# Patient Record
Sex: MALE | Race: BLACK OR AFRICAN AMERICAN | NOT HISPANIC OR LATINO
[De-identification: names, ages, dates, MRNs, and addresses within clinical notes are randomized per-mention and may not be internally consistent; named-entity substitution may affect disease eponyms.]

---

## 2018-03-27 PROBLEM — Z00.00 ENCOUNTER FOR PREVENTIVE HEALTH EXAMINATION: Status: ACTIVE | Noted: 2018-03-27

## 2018-04-23 ENCOUNTER — APPOINTMENT (OUTPATIENT)
Dept: OTHER | Facility: CLINIC | Age: 48
End: 2018-04-23
Payer: COMMERCIAL

## 2018-04-23 ENCOUNTER — LABORATORY RESULT (OUTPATIENT)
Age: 48
End: 2018-04-23

## 2018-04-23 VITALS
SYSTOLIC BLOOD PRESSURE: 148 MMHG | HEIGHT: 66 IN | DIASTOLIC BLOOD PRESSURE: 94 MMHG | WEIGHT: 292 LBS | RESPIRATION RATE: 16 BRPM | OXYGEN SATURATION: 100 % | BODY MASS INDEX: 46.93 KG/M2 | HEART RATE: 65 BPM

## 2018-04-23 DIAGNOSIS — Z99.89 OBSTRUCTIVE SLEEP APNEA (ADULT) (PEDIATRIC): ICD-10-CM

## 2018-04-23 DIAGNOSIS — Z04.9 ENCOUNTER FOR EXAMINATION AND OBSERVATION FOR UNSPECIFIED REASON: ICD-10-CM

## 2018-04-23 DIAGNOSIS — Z80.42 FAMILY HISTORY OF MALIGNANT NEOPLASM OF PROSTATE: ICD-10-CM

## 2018-04-23 DIAGNOSIS — G47.33 OBSTRUCTIVE SLEEP APNEA (ADULT) (PEDIATRIC): ICD-10-CM

## 2018-04-23 DIAGNOSIS — F17.290 NICOTINE DEPENDENCE, OTHER TOBACCO PRODUCT, UNCOMPLICATED: ICD-10-CM

## 2018-04-23 PROCEDURE — 96150: CPT

## 2018-04-23 PROCEDURE — 94060 EVALUATION OF WHEEZING: CPT

## 2018-04-23 PROCEDURE — 99386 PREV VISIT NEW AGE 40-64: CPT

## 2018-04-24 LAB
ALBUMIN SERPL ELPH-MCNC: 3.8 G/DL
ALP BLD-CCNC: 99 U/L
ALT SERPL-CCNC: 22 U/L
ANION GAP SERPL CALC-SCNC: 13 MMOL/L
APPEARANCE: CLEAR
AST SERPL-CCNC: 17 U/L
BASOPHILS # BLD AUTO: 0.03 K/UL
BASOPHILS NFR BLD AUTO: 0.3 %
BILIRUB SERPL-MCNC: 0.4 MG/DL
BILIRUBIN URINE: NEGATIVE
BLOOD URINE: NEGATIVE
BUN SERPL-MCNC: 10 MG/DL
CALCIUM SERPL-MCNC: 9.2 MG/DL
CHLORIDE SERPL-SCNC: 103 MMOL/L
CHOLEST SERPL-MCNC: 150 MG/DL
CHOLEST/HDLC SERPL: 5.2 RATIO
CO2 SERPL-SCNC: 29 MMOL/L
COLOR: YELLOW
CREAT SERPL-MCNC: 0.95 MG/DL
EOSINOPHIL # BLD AUTO: 0.08 K/UL
EOSINOPHIL NFR BLD AUTO: 0.9 %
GLUCOSE QUALITATIVE U: NEGATIVE MG/DL
GLUCOSE SERPL-MCNC: 133 MG/DL
HCT VFR BLD CALC: 40 %
HDLC SERPL-MCNC: 29 MG/DL
HGB BLD-MCNC: 12.1 G/DL
IMM GRANULOCYTES NFR BLD AUTO: 0.3 %
KETONES URINE: NEGATIVE
LDLC SERPL CALC-MCNC: 101 MG/DL
LEUKOCYTE ESTERASE URINE: ABNORMAL
LYMPHOCYTES # BLD AUTO: 3.11 K/UL
LYMPHOCYTES NFR BLD AUTO: 33.5 %
MAN DIFF?: NORMAL
MCHC RBC-ENTMCNC: 24 PG
MCHC RBC-ENTMCNC: 30.3 GM/DL
MCV RBC AUTO: 79.4 FL
MONOCYTES # BLD AUTO: 0.41 K/UL
MONOCYTES NFR BLD AUTO: 4.4 %
NEUTROPHILS # BLD AUTO: 5.62 K/UL
NEUTROPHILS NFR BLD AUTO: 60.6 %
NITRITE URINE: NEGATIVE
PH URINE: 5.5
PLATELET # BLD AUTO: 379 K/UL
POTASSIUM SERPL-SCNC: 4.9 MMOL/L
PROT SERPL-MCNC: 7.7 G/DL
PROTEIN URINE: NEGATIVE MG/DL
RBC # BLD: 5.04 M/UL
RBC # FLD: 16.2 %
SODIUM SERPL-SCNC: 145 MMOL/L
SPECIFIC GRAVITY URINE: 1.02
TRIGL SERPL-MCNC: 99 MG/DL
UROBILINOGEN URINE: 1 MG/DL
WBC # FLD AUTO: 9.28 K/UL

## 2018-05-07 ENCOUNTER — TRANSCRIPTION ENCOUNTER (OUTPATIENT)
Age: 48
End: 2018-05-07

## 2018-05-23 ENCOUNTER — TRANSCRIPTION ENCOUNTER (OUTPATIENT)
Age: 48
End: 2018-05-23

## 2018-06-11 ENCOUNTER — APPOINTMENT (OUTPATIENT)
Dept: PSYCHIATRY | Facility: CLINIC | Age: 48
End: 2018-06-11

## 2019-03-25 ENCOUNTER — APPOINTMENT (OUTPATIENT)
Dept: OTHER | Facility: CLINIC | Age: 49
End: 2019-03-25

## 2022-06-23 ENCOUNTER — APPOINTMENT (OUTPATIENT)
Dept: OTHER | Facility: CLINIC | Age: 52
End: 2022-06-23

## 2022-11-08 ENCOUNTER — FORM ENCOUNTER (OUTPATIENT)
Age: 52
End: 2022-11-08

## 2023-03-09 ENCOUNTER — TRANSCRIPTION ENCOUNTER (OUTPATIENT)
Age: 53
End: 2023-03-09

## 2024-11-07 ENCOUNTER — APPOINTMENT (OUTPATIENT)
Dept: OTHER | Facility: CLINIC | Age: 54
End: 2024-11-07

## 2024-12-03 ENCOUNTER — APPOINTMENT (OUTPATIENT)
Dept: OTHER | Facility: CLINIC | Age: 54
End: 2024-12-03

## 2024-12-03 DIAGNOSIS — Z04.9 ENCOUNTER FOR EXAMINATION AND OBSERVATION FOR UNSPECIFIED REASON: ICD-10-CM

## 2025-02-16 ENCOUNTER — INPATIENT (INPATIENT)
Facility: HOSPITAL | Age: 55
LOS: 2 days | Discharge: ANOTHER HOSPITAL | End: 2025-02-19
Attending: HOSPITALIST | Admitting: INTERNAL MEDICINE
Payer: OTHER GOVERNMENT

## 2025-02-16 VITALS
TEMPERATURE: 103 F | WEIGHT: 296.08 LBS | DIASTOLIC BLOOD PRESSURE: 95 MMHG | SYSTOLIC BLOOD PRESSURE: 132 MMHG | OXYGEN SATURATION: 96 % | RESPIRATION RATE: 18 BRPM | HEART RATE: 124 BPM | HEIGHT: 66 IN

## 2025-02-16 LAB
ALBUMIN SERPL ELPH-MCNC: 2.6 G/DL — LOW (ref 3.3–5)
ALP SERPL-CCNC: 71 U/L — SIGNIFICANT CHANGE UP (ref 40–120)
ALT FLD-CCNC: 55 U/L — SIGNIFICANT CHANGE UP (ref 12–78)
ANION GAP SERPL CALC-SCNC: 12 MMOL/L — SIGNIFICANT CHANGE UP (ref 5–17)
APPEARANCE UR: ABNORMAL
APTT BLD: 26.2 SEC — SIGNIFICANT CHANGE UP (ref 24.5–35.6)
AST SERPL-CCNC: 21 U/L — SIGNIFICANT CHANGE UP (ref 15–37)
BASOPHILS # BLD AUTO: 0 K/UL — SIGNIFICANT CHANGE UP (ref 0–0.2)
BASOPHILS NFR BLD AUTO: 0 % — SIGNIFICANT CHANGE UP (ref 0–2)
BILIRUB SERPL-MCNC: 0.4 MG/DL — SIGNIFICANT CHANGE UP (ref 0.2–1.2)
BILIRUB UR-MCNC: NEGATIVE — SIGNIFICANT CHANGE UP
BUN SERPL-MCNC: 38 MG/DL — HIGH (ref 7–23)
CALCIUM SERPL-MCNC: 9.5 MG/DL — SIGNIFICANT CHANGE UP (ref 8.5–10.1)
CHLORIDE SERPL-SCNC: 99 MMOL/L — SIGNIFICANT CHANGE UP (ref 96–108)
CO2 SERPL-SCNC: 25 MMOL/L — SIGNIFICANT CHANGE UP (ref 22–31)
COLOR SPEC: ABNORMAL
CREAT SERPL-MCNC: 2.2 MG/DL — HIGH (ref 0.5–1.3)
DIFF PNL FLD: ABNORMAL
EGFR: 35 ML/MIN/1.73M2 — LOW
EOSINOPHIL # BLD AUTO: 0 K/UL — SIGNIFICANT CHANGE UP (ref 0–0.5)
EOSINOPHIL NFR BLD AUTO: 0 % — SIGNIFICANT CHANGE UP (ref 0–6)
FLUAV AG NPH QL: SIGNIFICANT CHANGE UP
FLUBV AG NPH QL: SIGNIFICANT CHANGE UP
GLUCOSE SERPL-MCNC: 282 MG/DL — HIGH (ref 70–99)
GLUCOSE UR QL: >=1000 MG/DL
HCT VFR BLD CALC: 45.5 % — SIGNIFICANT CHANGE UP (ref 39–50)
HGB BLD-MCNC: 14.5 G/DL — SIGNIFICANT CHANGE UP (ref 13–17)
INR BLD: 1.43 RATIO — HIGH (ref 0.85–1.16)
KETONES UR-MCNC: NEGATIVE MG/DL — SIGNIFICANT CHANGE UP
LACTATE SERPL-SCNC: 3.9 MMOL/L — HIGH (ref 0.7–2)
LACTATE SERPL-SCNC: 5.8 MMOL/L — CRITICAL HIGH (ref 0.7–2)
LEUKOCYTE ESTERASE UR-ACNC: ABNORMAL
LYMPHOCYTES # BLD AUTO: 0.85 K/UL — LOW (ref 1–3.3)
LYMPHOCYTES # BLD AUTO: 5 % — LOW (ref 13–44)
MCHC RBC-ENTMCNC: 28.1 PG — SIGNIFICANT CHANGE UP (ref 27–34)
MCHC RBC-ENTMCNC: 31.9 G/DL — LOW (ref 32–36)
MCV RBC AUTO: 88.2 FL — SIGNIFICANT CHANGE UP (ref 80–100)
MONOCYTES # BLD AUTO: 0.68 K/UL — SIGNIFICANT CHANGE UP (ref 0–0.9)
MONOCYTES NFR BLD AUTO: 4 % — SIGNIFICANT CHANGE UP (ref 2–14)
NEUTROPHILS # BLD AUTO: 14.22 K/UL — HIGH (ref 1.8–7.4)
NEUTROPHILS NFR BLD AUTO: 75 % — SIGNIFICANT CHANGE UP (ref 43–77)
NITRITE UR-MCNC: NEGATIVE — SIGNIFICANT CHANGE UP
NRBC BLD AUTO-RTO: SIGNIFICANT CHANGE UP /100 WBCS (ref 0–0)
PH UR: 7.5 — SIGNIFICANT CHANGE UP (ref 5–8)
PLATELET # BLD AUTO: 251 K/UL — SIGNIFICANT CHANGE UP (ref 150–400)
POTASSIUM SERPL-MCNC: 3.7 MMOL/L — SIGNIFICANT CHANGE UP (ref 3.5–5.3)
POTASSIUM SERPL-SCNC: 3.7 MMOL/L — SIGNIFICANT CHANGE UP (ref 3.5–5.3)
PROT SERPL-MCNC: 6.8 GM/DL — SIGNIFICANT CHANGE UP (ref 6–8.3)
PROT UR-MCNC: >=1000 MG/DL
PROTHROM AB SERPL-ACNC: 16.1 SEC — HIGH (ref 9.9–13.4)
RBC # BLD: 5.16 M/UL — SIGNIFICANT CHANGE UP (ref 4.2–5.8)
RBC # FLD: 16.6 % — HIGH (ref 10.3–14.5)
RSV RNA NPH QL NAA+NON-PROBE: SIGNIFICANT CHANGE UP
SARS-COV-2 RNA SPEC QL NAA+PROBE: SIGNIFICANT CHANGE UP
SODIUM SERPL-SCNC: 136 MMOL/L — SIGNIFICANT CHANGE UP (ref 135–145)
SP GR SPEC: 1.02 — SIGNIFICANT CHANGE UP (ref 1–1.03)
TROPONIN I, HIGH SENSITIVITY RESULT: 9.5 NG/L — SIGNIFICANT CHANGE UP
UROBILINOGEN FLD QL: 1 MG/DL — SIGNIFICANT CHANGE UP (ref 0.2–1)
WBC # BLD: 16.93 K/UL — HIGH (ref 3.8–10.5)
WBC # FLD AUTO: 16.93 K/UL — HIGH (ref 3.8–10.5)

## 2025-02-16 PROCEDURE — 93010 ELECTROCARDIOGRAM REPORT: CPT

## 2025-02-16 PROCEDURE — 99291 CRITICAL CARE FIRST HOUR: CPT

## 2025-02-16 PROCEDURE — 74177 CT ABD & PELVIS W/CONTRAST: CPT | Mod: 26

## 2025-02-16 PROCEDURE — 71045 X-RAY EXAM CHEST 1 VIEW: CPT | Mod: 26

## 2025-02-16 RX ORDER — PIPERACILLIN-TAZO-DEXTROSE,ISO 3.375G/5
3.38 IV SOLUTION, PIGGYBACK PREMIX FROZEN(ML) INTRAVENOUS EVERY 8 HOURS
Refills: 0 | Status: DISCONTINUED | OUTPATIENT
Start: 2025-02-17 | End: 2025-02-18

## 2025-02-16 RX ORDER — VANCOMYCIN HCL IN 5 % DEXTROSE 1.5G/250ML
1000 PLASTIC BAG, INJECTION (ML) INTRAVENOUS ONCE
Refills: 0 | Status: COMPLETED | OUTPATIENT
Start: 2025-02-16 | End: 2025-02-16

## 2025-02-16 RX ORDER — CEFTRIAXONE 500 MG/1
1000 INJECTION, POWDER, FOR SOLUTION INTRAMUSCULAR; INTRAVENOUS ONCE
Refills: 0 | Status: COMPLETED | OUTPATIENT
Start: 2025-02-16 | End: 2025-02-16

## 2025-02-16 RX ORDER — PIPERACILLIN-TAZO-DEXTROSE,ISO 3.375G/5
3.38 IV SOLUTION, PIGGYBACK PREMIX FROZEN(ML) INTRAVENOUS ONCE
Refills: 0 | Status: COMPLETED | OUTPATIENT
Start: 2025-02-16 | End: 2025-02-16

## 2025-02-16 RX ORDER — ACETAMINOPHEN 500 MG/5ML
650 LIQUID (ML) ORAL ONCE
Refills: 0 | Status: COMPLETED | OUTPATIENT
Start: 2025-02-16 | End: 2025-02-16

## 2025-02-16 RX ADMIN — CEFTRIAXONE 100 MILLIGRAM(S): 500 INJECTION, POWDER, FOR SOLUTION INTRAMUSCULAR; INTRAVENOUS at 19:38

## 2025-02-16 RX ADMIN — Medication 650 MILLIGRAM(S): at 18:17

## 2025-02-16 RX ADMIN — Medication 200 GRAM(S): at 23:14

## 2025-02-16 RX ADMIN — Medication 2000 MILLILITER(S): at 18:12

## 2025-02-16 NOTE — ED ADULT TRIAGE NOTE - CHIEF COMPLAINT QUOTE
[facility] generalized weakness, increased sleeping, and tachycardia since this AM. Tracey in place with hematuria, pt states he is on/off with UTI. Pt AOx4, PMH HTN, DM, Hypothyroidism, afib

## 2025-02-16 NOTE — ED PROVIDER NOTE - CLINICAL SUMMARY MEDICAL DECISION MAKING FREE TEXT BOX
Patient is a 54-year-old male presenting to the emergency department today for urinary symptoms.  CBC shows leukocytosis.  No acute anemia.  No thrombocytopenia.  CMP shows no significant electrolyte abnormalities.  Creatinine noted to be 2.20.  No prior creatinine noted while in our emergency department.  No evidence of elevated LFTs.  Urinalysis shows evidence of urinary tract infection.  He was covered with Rocephin.  No COVID, flu, or RSV.  Patient was ordered Rocephin.  Ordered 2000 mL of normal saline.    Noted to have hematuria.  Tracey catheter will be exchanged.    CT of the abdomen pelvis was ordered, however has not resulted at this time.    Is a CT of the abdomen pelvis is pending, the patient be signed out to the oncoming physician.  Please see the oncoming physician's addendum's, notes, and progress notes for any change in this patient's management or care.

## 2025-02-16 NOTE — CONSULT NOTE ADULT - ASSESSMENT
54M with transverse myelitis presenting in urosepsis  hematuria in palmer, 2/2 infection, palmer in place for  neurogenic bladder  Imaging shows grade 1 emphysematous pyelonephritis, perinephric streaking extending in retroperitoneum  no ureter dilitation  no obstucting stone  no old records available in [facility] for comparison  Recommend to continue IV abx, infdection likely MDR, f/u culture  IVF resuscitation  ICU consult  likely rescan tomorrow for progression of hydro  discussed with Dr Reeves

## 2025-02-16 NOTE — ED PROVIDER NOTE - PHYSICAL EXAMINATION
GENERAL: The patient appears well and is in no apparent distress.    EYES: Pupils equal and reactive.  Extraocular eye movements are intact.    ENT: Head is atraumatic.  Posterior oropharynx is unremarkable.      RESPIRATORY: Lungs are clear to auscultation bilaterally.  The patient has no significant wheezing, rhonchi, or rales.    CARDIOVASCULAR: Tachycardic rate    ABDOMEN: Abdomen is soft, nondistended, and non-peritoneal.  The patient has no focal areas of tenderness.  Patient is chronic indwelling Tracey catheter    SKIN: Skin is intact without evidence of significant lacerations or sores.    MUSCULOSKELETAL: Patient has good range of motion of all extremities.  The patient has good distal cap refill.  The patient has palpable distal pulses.  No obvious edema is noted.    NEUROLOGIC: Cranial nerves II through XII are grossly within normal limits.  Sensory and motor examination is unremarkable.    PSYCHIATRIC: Patient is awake, alert, and oriented ×4.

## 2025-02-16 NOTE — ED PROVIDER NOTE - OBJECTIVE STATEMENT
This patient is a 54-year-old male presenting to the emergency department today for urinary symptoms as well as fever.  He has a past medical history of transverse myelitis.  Near complete paralysis from the waist down.  Chronic indwelling Tracey catheter.  States that for roughly the last 1 week has been noticing blood in the Tracey catheter.  He does reside at a nursing facility.  States that he was placed on antibiotics for possible urinary tract infection last week.  Today he was noticed to have weakness and increased lethargy.  He has no chest pain.  No headaches, lightheadedness, or dizziness.  No shortness of breath.  No abdominal pain.  No other complaints at this time.

## 2025-02-16 NOTE — PROVIDER CONTACT NOTE (EICU) - BACKGROUND
54 year old male with Transverse myelitis, Bed bound, chronic palmer with obesity with TESSIE who p/w hematuria   In ED found to have ESEQUIEL and emphysematous nephritis

## 2025-02-16 NOTE — H&P ADULT - NSICDXPASTMEDICALHX_GEN_ALL_CORE_FT
PAST MEDICAL HISTORY:  Anemia of chronic disease     Chronic indwelling Tracey catheter     HTN (hypertension)     Morbid obesity     Transverse myelitis

## 2025-02-16 NOTE — ED PROVIDER NOTE - NS ED ROS FT
CONSTITUTIONAL: Positive for weakness and fevers.  No weight loss.  No chills.    HEENT:    Eyes: No visual loss, blurred vision, double vision or yellow sclerae.  Ears, Nose, Throat: No hearing loss, sneezing, congestion, runny nose or sore throat.    CARDIOVASCULAR: No chest pain, chest pressure or chest discomfort. No palpitations or edema.    RESPIRATORY: No shortness of breath, cough or sputum.    GASTROINTESTINAL: No anorexia, nausea, vomiting or diarrhea. No abdominal pain or blood.    SKIN: No rash or itching.    GENITOURINARY: Patient denies any changes to bowel or bladder function.  Positive for hematuria    NEUROLOGICAL: No headache, dizziness, syncope, paralysis, ataxia, numbness or tingling in the extremities. No change in bowel or bladder control.    MUSCULOSKELETAL: No muscle, back pain, joint pain or stiffness.

## 2025-02-16 NOTE — ED ADULT NURSE NOTE - ED STAT RN HANDOFF DETAILS
report given to Rashmi SALMERON. pt with levo infusing via 20G L AC access and fluids/vanco infusing via 22G R AC access. pt A&OX3.

## 2025-02-16 NOTE — H&P ADULT - HISTORY OF PRESENT ILLNESS
53 yo m pmhx morbid obesity, transverse myelitis, paralyzed from waist down, chronic palmer, TESSIE on nocturnal CPAP, anemia, HTN, HLD biba from facility with weakness/lethargy and hematuria.  Per patient, his palmer was replaced on 2/10, since has had some hematuria and was placed on antibiotics for concern UTI.  Today patient lethargic, weak prompting presentation.  In ED patient febrile to 102.9F, tachycardic to the 120s, normotensive, labs significant for wbc ~16, BUN/Cr 38/2.2, lactate 5.8, +UA, ct abd/pelvis suggestive of emphysematous cystitis and pyelitis.  ICU consult for hypotension.  Initial -140/90s now 90s/60s. Admit to ICU.

## 2025-02-16 NOTE — ED PROVIDER NOTE - NS ED MD DISPO ADMITTING SERVICE
Nausea / Vomiting    Nausea is the feeling that you have to vomit. As nausea gets worse, it can lead to vomiting. Vomiting puts you at an increased risk for dehydration. Older adults and people with other diseases or a weak immune system are at higher risk for dehydration. Drink clear fluids in small but frequent amounts as tolerated. Eat bland, easy-to-digest foods in small amounts as tolerated.    SEEK IMMEDIATE MEDICAL CARE IF YOU HAVE ANY OF THE FOLLOWING SYMPTOMS: fever, inability to keep sufficient fluids down, black or bloody vomitus, black or bloody stools, lightheadedness/dizziness, chest pain, severe headache, rash, shortness of breath, cold or clammy skin, confusion, pain with urination, or any signs of dehydration.
ICU

## 2025-02-16 NOTE — H&P ADULT - NSHPPHYSICALEXAM_GEN_ALL_CORE
GENERAL: morbidly obese, pleasant male, lying in bed, nad  HEENT: nc/at CPAP in place  CV: rrr  RESP: diminished but grossly cta b/l   ABD: soft, nontender, nondistended, +bs  : palmer in place, penis wnl, urine draining with hematuria/yellow/tan creamy output  MSK: unable to fully assess 2/2 obesity   EXT: as below  SKIN: warm, non pitting edema  NEURO: alert, oriented and interactive

## 2025-02-16 NOTE — CONSULT NOTE ADULT - SUBJECTIVE AND OBJECTIVE BOX
Chief Complaint:  Patient is a 54y old  Male who presents with a chief complaint of hematuria    HPI: 54M with h/o transverse myelitis, chronic palmer catheter, sent from facility for lethargy today, hematuria for last week, has been recieving abx (unknown type) at facility for tx of UTI.  Febrile, tachycardic here at triage.  Gross pink hematuria in palmer    Physical Exam:    Vital Signs:  Vital Signs Last 24 Hrs  T(C): 37.3 (16 Feb 2025 21:06), Max: 39.4 (16 Feb 2025 17:14)  T(F): 99.2 (16 Feb 2025 21:06), Max: 102.9 (16 Feb 2025 17:14)  HR: 101 (16 Feb 2025 21:06) (101 - 124)  BP: 94/67 (16 Feb 2025 21:06) (94/67 - 132/95)  BP(mean): --  RR: 19 (16 Feb 2025 21:06) (18 - 19)  SpO2: 94% (16 Feb 2025 21:06) (94% - 96%)    Parameters below as of 16 Feb 2025 21:06  Patient On (Oxygen Delivery Method): room air      Daily Height in cm: 167.64 (16 Feb 2025 17:14)    Daily     Constitutional: NAD  Respiratory: normal work of breathing  : palmer with pink urine output       Imaging:    < from: CT Abdomen and Pelvis w/ IV Cont (02.16.25 @ 20:08) >    *** ADDENDUM # 1 ***    Addendum to the body of the report entitled KIDNEYS:    No right-sided renal calculi are seen. In the lower pole of the left   kidney there are round hyperdense foci measuring up to 6 mm that may   represent intrarenal stones. No hydronephrosis of the left kidney is seen.    Of note, the patient has a Palmer catheter with trace amount of air in the   urinary bladder and therefore the differential for the air in the renal   collecting systems could be iatrogenic, however, given the presence of   debris in the collecting system, emphysematous pyelitis should be   excluded.    --- End of Report ---    *** END OF ADDENDUM # 1 ***      PROCEDURE DATE:  02/16/2025          INTERPRETATION:  CLINICAL INFORMATION: Fever and urinary symptoms.    COMPARISON: None.    CONTRAST/COMPLICATIONS:  IV Contrast: Omnipaque 350  90 cc administered   10 cc discarded  Oral Contrast: NONE  .    PROCEDURE:  CT of the Abdomen and Pelvis was performed.  Sagittal and coronal reformats were performed.    FINDINGS:  LOWER CHEST: Within normal limits.    LIVER: Within normal limits.  BILE DUCTS: Normal caliber.  GALLBLADDER: Cholecystectomy.  SPLEEN: Within normal limits.  PANCREAS: Within normal limits.  ADRENALS: Within normal limits.  KIDNEYS/URETERS: Mild right hydronephrosis with distention of the   extrarenal pelvis with gas and debris. Mild right perinephric stranding   extending along the posterior pararenal space into the pelvis.   Thick-walled left extrarenal pelvis contains gas. No left-sided   hydronephrosis. Symmetric bilateral renal enhancement. Left upper pole   cyst.    BLADDER: Decompressed by Palmer catheter.  REPRODUCTIVE ORGANS: Prostate within normal limits.    BOWEL: No bowel obstruction. Appendix is normal.  PERITONEUM/RETROPERITONEUM: Within normal limits.  VESSELS: Within normal limits.  LYMPH NODES: No lymphadenopathy.  ABDOMINAL WALL: Moderate fat-containing umbilical hernia.  BONES: Moderate superior endplate compression deformity at L2, likely   chronic. Degenerative changes of the spine.    IMPRESSION:  Gas and debris within the renal collecting systems along with thickening   of the left renal pelvis. 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 Please correlate with urinalysis.    Mild hydronephrosis of the right kidney for which urology consult is   advised.        --- End of Report ---    ***Please see the addendum at the top of this report. It may contain   additional important information or changes.****        KRISTA FORBES MD  This document has been electronically signed. Feb 16 2025  9:48PM  1st Addendum: KRISTA FORBES MD  The first addendum was electronically signed on: Feb 16 2025 10:37PM.    < end of copied text >

## 2025-02-16 NOTE — H&P ADULT - ASSESSMENT
55 yo m pmhx morbid obesity, transverse myelitis, paralyzed from waist down, chronic palmer, TESSIE on nocturnal CPAP, anemia, HTN, HLD admitted with     1. Uroseptic shock   2. ESEQUIEL    NEURO: No active issues  CV: Severe sepsis bordering septic shock, levophed for map >65, additional IVF as tolerated; trend lactate  RESP: CPAP qhs   RENAL: avoid nephrotoxic meds, renally dose meds, trend urine output, bun/cr and electrolytes. repalce lytes as needed.    GI: npo except water/meds  ENDO: poct q6hr   ID: Zosyn/vancomycin for coverage. ux pending; repeat ct abd/pelvis tomorrow to monitor progression per urology   HEME:  hold vte ppx in setting of hematuria   DISPO: Full code.      case discussed with eicu attending Dr. Ansari.     Critical Care time: 60 mins assessing presenting problems of acute illness that poses high probability of life threatening deterioration or end organ damage/dysfunction.  Medical decision making including Initiating plan of care, reviewing data, reviewing radiology, discussing with multidisciplinary team, non inclusive of procedures, discussing goals of care with patient/family    DATE OF DOCUMENTATION EQUIVALENT TO DATE OF SERVICES RENDERED

## 2025-02-16 NOTE — ED ADULT NURSE NOTE - OBJECTIVE STATEMENT
patient [facility]Ox[facility] generalized weakness. Patient states he has had increased lethargy, fever, and tachycardia since this AM. Tracey in place with hematuria, pt states he is on/off with UTI. Patient denies any chest pain, sob, rubi.

## 2025-02-16 NOTE — ED ADULT NURSE NOTE - NSFALLHARMRISKINTERV_ED_ALL_ED

## 2025-02-17 LAB
ALBUMIN SERPL ELPH-MCNC: 2.2 G/DL — LOW (ref 3.3–5)
ALP SERPL-CCNC: 78 U/L — SIGNIFICANT CHANGE UP (ref 40–120)
ALT FLD-CCNC: 44 U/L — SIGNIFICANT CHANGE UP (ref 12–78)
ANION GAP SERPL CALC-SCNC: 11 MMOL/L — SIGNIFICANT CHANGE UP (ref 5–17)
AST SERPL-CCNC: 12 U/L — LOW (ref 15–37)
BASOPHILS # BLD AUTO: 0.01 K/UL — SIGNIFICANT CHANGE UP (ref 0–0.2)
BASOPHILS NFR BLD AUTO: 0 % — SIGNIFICANT CHANGE UP (ref 0–2)
BILIRUB SERPL-MCNC: 0.7 MG/DL — SIGNIFICANT CHANGE UP (ref 0.2–1.2)
BLD GP AB SCN SERPL QL: SIGNIFICANT CHANGE UP
BUN SERPL-MCNC: 39 MG/DL — HIGH (ref 7–23)
CALCIUM SERPL-MCNC: 8.9 MG/DL — SIGNIFICANT CHANGE UP (ref 8.5–10.1)
CHLORIDE SERPL-SCNC: 103 MMOL/L — SIGNIFICANT CHANGE UP (ref 96–108)
CO2 SERPL-SCNC: 24 MMOL/L — SIGNIFICANT CHANGE UP (ref 22–31)
CREAT SERPL-MCNC: 2.05 MG/DL — HIGH (ref 0.5–1.3)
EGFR: 38 ML/MIN/1.73M2 — LOW
EOSINOPHIL # BLD AUTO: 0.02 K/UL — SIGNIFICANT CHANGE UP (ref 0–0.5)
EOSINOPHIL NFR BLD AUTO: 0.1 % — SIGNIFICANT CHANGE UP (ref 0–6)
GLUCOSE BLDC GLUCOMTR-MCNC: 162 MG/DL — HIGH (ref 70–99)
GLUCOSE BLDC GLUCOMTR-MCNC: 185 MG/DL — HIGH (ref 70–99)
GLUCOSE BLDC GLUCOMTR-MCNC: 267 MG/DL — HIGH (ref 70–99)
GLUCOSE BLDC GLUCOMTR-MCNC: 302 MG/DL — HIGH (ref 70–99)
GLUCOSE BLDC GLUCOMTR-MCNC: 340 MG/DL — HIGH (ref 70–99)
GLUCOSE SERPL-MCNC: 156 MG/DL — HIGH (ref 70–99)
GRAM STN FLD: ABNORMAL
GRAM STN FLD: ABNORMAL
HCT VFR BLD CALC: 41.4 % — SIGNIFICANT CHANGE UP (ref 39–50)
HGB BLD-MCNC: 12.8 G/DL — LOW (ref 13–17)
IMM GRANULOCYTES NFR BLD AUTO: 6.8 % — HIGH (ref 0–0.9)
LACTATE SERPL-SCNC: 2.6 MMOL/L — HIGH (ref 0.7–2)
LYMPHOCYTES # BLD AUTO: 2.24 K/UL — SIGNIFICANT CHANGE UP (ref 1–3.3)
LYMPHOCYTES # BLD AUTO: 6.7 % — LOW (ref 13–44)
MAGNESIUM SERPL-MCNC: 1.8 MG/DL — SIGNIFICANT CHANGE UP (ref 1.6–2.6)
MCHC RBC-ENTMCNC: 27.9 PG — SIGNIFICANT CHANGE UP (ref 27–34)
MCHC RBC-ENTMCNC: 30.9 G/DL — LOW (ref 32–36)
MCV RBC AUTO: 90.2 FL — SIGNIFICANT CHANGE UP (ref 80–100)
METHOD TYPE: SIGNIFICANT CHANGE UP
MONOCYTES # BLD AUTO: 1.13 K/UL — HIGH (ref 0–0.9)
MONOCYTES NFR BLD AUTO: 3.4 % — SIGNIFICANT CHANGE UP (ref 2–14)
MRSA PCR RESULT.: SIGNIFICANT CHANGE UP
NEUTROPHILS # BLD AUTO: 27.74 K/UL — HIGH (ref 1.8–7.4)
NEUTROPHILS NFR BLD AUTO: 83 % — HIGH (ref 43–77)
NRBC BLD AUTO-RTO: 0 /100 WBCS — SIGNIFICANT CHANGE UP (ref 0–0)
PHOSPHATE SERPL-MCNC: 5.4 MG/DL — HIGH (ref 2.5–4.5)
PLATELET # BLD AUTO: 194 K/UL — SIGNIFICANT CHANGE UP (ref 150–400)
POTASSIUM SERPL-MCNC: 4.4 MMOL/L — SIGNIFICANT CHANGE UP (ref 3.5–5.3)
POTASSIUM SERPL-SCNC: 4.4 MMOL/L — SIGNIFICANT CHANGE UP (ref 3.5–5.3)
PROT SERPL-MCNC: 6.2 GM/DL — SIGNIFICANT CHANGE UP (ref 6–8.3)
PROTEUS SP DNA BLD POS QL NAA+NON-PROBE: SIGNIFICANT CHANGE UP
RBC # BLD: 4.59 M/UL — SIGNIFICANT CHANGE UP (ref 4.2–5.8)
RBC # FLD: 16.4 % — HIGH (ref 10.3–14.5)
S AUREUS DNA NOSE QL NAA+PROBE: SIGNIFICANT CHANGE UP
SODIUM SERPL-SCNC: 138 MMOL/L — SIGNIFICANT CHANGE UP (ref 135–145)
SPECIMEN SOURCE: SIGNIFICANT CHANGE UP
SPECIMEN SOURCE: SIGNIFICANT CHANGE UP
WBC # BLD: 33.41 K/UL — HIGH (ref 3.8–10.5)
WBC # FLD AUTO: 33.41 K/UL — HIGH (ref 3.8–10.5)

## 2025-02-17 PROCEDURE — 99291 CRITICAL CARE FIRST HOUR: CPT

## 2025-02-17 RX ORDER — INSULIN GLARGINE-YFGN 100 [IU]/ML
10 INJECTION, SOLUTION SUBCUTANEOUS EVERY MORNING
Refills: 0 | Status: DISCONTINUED | OUTPATIENT
Start: 2025-02-17 | End: 2025-02-17

## 2025-02-17 RX ORDER — FERROUS SULFATE 137(45) MG
325 TABLET, EXTENDED RELEASE ORAL DAILY
Refills: 0 | Status: DISCONTINUED | OUTPATIENT
Start: 2025-02-17 | End: 2025-02-19

## 2025-02-17 RX ORDER — ROSUVASTATIN CALCIUM 20 MG/1
1 TABLET, FILM COATED ORAL
Refills: 0 | DISCHARGE

## 2025-02-17 RX ORDER — TIZANIDINE 4 MG/1
2 TABLET ORAL EVERY 8 HOURS
Refills: 0 | Status: DISCONTINUED | OUTPATIENT
Start: 2025-02-17 | End: 2025-02-19

## 2025-02-17 RX ORDER — NICOTINE POLACRILEX 4 MG/1
1 GUM, CHEWING ORAL
Refills: 0 | DISCHARGE

## 2025-02-17 RX ORDER — SULFAMETHOXAZOLE/TRIMETHOPRIM 800-160 MG
1 TABLET ORAL
Refills: 0 | Status: DISCONTINUED | OUTPATIENT
Start: 2025-02-19 | End: 2025-02-19

## 2025-02-17 RX ORDER — FERROUS SULFATE 137(45) MG
325 TABLET, EXTENDED RELEASE ORAL
Refills: 0 | DISCHARGE

## 2025-02-17 RX ORDER — LEVOTHYROXINE SODIUM 300 MCG
1 TABLET ORAL
Refills: 0 | DISCHARGE

## 2025-02-17 RX ORDER — INSULIN LISPRO 100 U/ML
INJECTION, SOLUTION INTRAVENOUS; SUBCUTANEOUS EVERY 6 HOURS
Refills: 0 | Status: DISCONTINUED | OUTPATIENT
Start: 2025-02-17 | End: 2025-02-17

## 2025-02-17 RX ORDER — LEVOTHYROXINE SODIUM 300 MCG
50 TABLET ORAL DAILY
Refills: 0 | Status: DISCONTINUED | OUTPATIENT
Start: 2025-02-17 | End: 2025-02-19

## 2025-02-17 RX ORDER — HEPARIN SODIUM 1000 [USP'U]/ML
7500 INJECTION INTRAVENOUS; SUBCUTANEOUS EVERY 8 HOURS
Refills: 0 | Status: DISCONTINUED | OUTPATIENT
Start: 2025-02-17 | End: 2025-02-19

## 2025-02-17 RX ORDER — HYDROCORTISONE 20 MG
50 TABLET ORAL EVERY 6 HOURS
Refills: 0 | Status: DISCONTINUED | OUTPATIENT
Start: 2025-02-17 | End: 2025-02-18

## 2025-02-17 RX ORDER — INSULIN LISPRO 100 U/ML
INJECTION, SOLUTION INTRAVENOUS; SUBCUTANEOUS
Refills: 0 | Status: DISCONTINUED | OUTPATIENT
Start: 2025-02-17 | End: 2025-02-19

## 2025-02-17 RX ORDER — METFORMIN HYDROCHLORIDE 850 MG/1
1 TABLET ORAL
Refills: 0 | DISCHARGE

## 2025-02-17 RX ORDER — TAMSULOSIN HYDROCHLORIDE 0.4 MG/1
1 CAPSULE ORAL
Refills: 0 | DISCHARGE

## 2025-02-17 RX ORDER — MAGNESIUM SULFATE 500 MG/ML
1 SYRINGE (ML) INJECTION ONCE
Refills: 0 | Status: COMPLETED | OUTPATIENT
Start: 2025-02-17 | End: 2025-02-17

## 2025-02-17 RX ORDER — SULFAMETHOXAZOLE/TRIMETHOPRIM 800-160 MG
1 TABLET ORAL
Refills: 0 | DISCHARGE

## 2025-02-17 RX ORDER — INSULIN GLARGINE-YFGN 100 [IU]/ML
30 INJECTION, SOLUTION SUBCUTANEOUS ONCE
Refills: 0 | Status: COMPLETED | OUTPATIENT
Start: 2025-02-17 | End: 2025-02-17

## 2025-02-17 RX ORDER — GABAPENTIN 400 MG/1
600 CAPSULE ORAL AT BEDTIME
Refills: 0 | Status: DISCONTINUED | OUTPATIENT
Start: 2025-02-17 | End: 2025-02-19

## 2025-02-17 RX ORDER — TIZANIDINE 4 MG/1
2 TABLET ORAL
Refills: 0 | DISCHARGE

## 2025-02-17 RX ORDER — NOREPINEPHRINE BITARTRATE 8 MG
0.05 SOLUTION INTRAVENOUS
Qty: 8 | Refills: 0 | Status: DISCONTINUED | OUTPATIENT
Start: 2025-02-17 | End: 2025-02-18

## 2025-02-17 RX ORDER — INSULIN LISPRO 100 U/ML
10 INJECTION, SOLUTION INTRAVENOUS; SUBCUTANEOUS
Refills: 0 | Status: DISCONTINUED | OUTPATIENT
Start: 2025-02-17 | End: 2025-02-19

## 2025-02-17 RX ORDER — SODIUM CHLORIDE 9 G/1000ML
1000 INJECTION, SOLUTION INTRAVENOUS ONCE
Refills: 0 | Status: COMPLETED | OUTPATIENT
Start: 2025-02-17 | End: 2025-02-17

## 2025-02-17 RX ORDER — GABAPENTIN 400 MG/1
1 CAPSULE ORAL
Refills: 0 | DISCHARGE

## 2025-02-17 RX ORDER — SULFAMETHOXAZOLE/TRIMETHOPRIM 800-160 MG
1 TABLET ORAL DAILY
Refills: 0 | Status: DISCONTINUED | OUTPATIENT
Start: 2025-02-17 | End: 2025-02-17

## 2025-02-17 RX ORDER — ACETAMINOPHEN 500 MG/5ML
1000 LIQUID (ML) ORAL
Refills: 0 | DISCHARGE

## 2025-02-17 RX ORDER — LATANOPROST PF 0.05 MG/ML
1 SOLUTION/ DROPS OPHTHALMIC
Refills: 0 | DISCHARGE

## 2025-02-17 RX ORDER — METOPROLOL SUCCINATE 50 MG/1
1 TABLET, EXTENDED RELEASE ORAL
Refills: 0 | DISCHARGE

## 2025-02-17 RX ORDER — FOLIC ACID 1 MG/1
1 TABLET ORAL
Refills: 0 | DISCHARGE

## 2025-02-17 RX ORDER — ROSUVASTATIN CALCIUM 20 MG/1
5 TABLET, FILM COATED ORAL AT BEDTIME
Refills: 0 | Status: DISCONTINUED | OUTPATIENT
Start: 2025-02-17 | End: 2025-02-19

## 2025-02-17 RX ORDER — APIXABAN 2.5 MG/1
1 TABLET, FILM COATED ORAL
Refills: 0 | DISCHARGE

## 2025-02-17 RX ORDER — NICOTINE POLACRILEX 4 MG/1
1 GUM, CHEWING ORAL DAILY
Refills: 0 | Status: DISCONTINUED | OUTPATIENT
Start: 2025-02-17 | End: 2025-02-19

## 2025-02-17 RX ORDER — FUROSEMIDE 10 MG/ML
1 INJECTION INTRAMUSCULAR; INTRAVENOUS
Refills: 0 | DISCHARGE

## 2025-02-17 RX ORDER — B1/B2/B3/B5/B6/B12/VIT C/FOLIC 500-0.5 MG
1 TABLET ORAL DAILY
Refills: 0 | Status: DISCONTINUED | OUTPATIENT
Start: 2025-02-17 | End: 2025-02-19

## 2025-02-17 RX ORDER — B1/B2/B3/B5/B6/B12/VIT C/FOLIC 500-0.5 MG
1 TABLET ORAL
Refills: 0 | DISCHARGE

## 2025-02-17 RX ORDER — INSULIN GLARGINE-YFGN 100 [IU]/ML
40 INJECTION, SOLUTION SUBCUTANEOUS EVERY MORNING
Refills: 0 | Status: DISCONTINUED | OUTPATIENT
Start: 2025-02-18 | End: 2025-02-19

## 2025-02-17 RX ORDER — PREDNISONE 20 MG/1
60 TABLET ORAL
Refills: 0 | DISCHARGE

## 2025-02-17 RX ORDER — SODIUM CHLORIDE 9 G/1000ML
1000 INJECTION, SOLUTION INTRAVENOUS
Refills: 0 | Status: DISCONTINUED | OUTPATIENT
Start: 2025-02-17 | End: 2025-02-18

## 2025-02-17 RX ORDER — INSULIN DEGLUDEC 100 U/ML
0 INJECTION, SOLUTION SUBCUTANEOUS
Refills: 0 | DISCHARGE

## 2025-02-17 RX ORDER — ACETAMINOPHEN 500 MG/5ML
2 LIQUID (ML) ORAL
Refills: 0 | DISCHARGE

## 2025-02-17 RX ORDER — TAMSULOSIN HYDROCHLORIDE 0.4 MG/1
0.4 CAPSULE ORAL AT BEDTIME
Refills: 0 | Status: DISCONTINUED | OUTPATIENT
Start: 2025-02-17 | End: 2025-02-19

## 2025-02-17 RX ORDER — FOLIC ACID 1 MG/1
1 TABLET ORAL DAILY
Refills: 0 | Status: DISCONTINUED | OUTPATIENT
Start: 2025-02-17 | End: 2025-02-19

## 2025-02-17 RX ORDER — GABAPENTIN 400 MG/1
400 CAPSULE ORAL
Refills: 0 | Status: DISCONTINUED | OUTPATIENT
Start: 2025-02-17 | End: 2025-02-19

## 2025-02-17 RX ORDER — EMPAGLIFLOZIN 25 MG/1
1 TABLET, FILM COATED ORAL
Refills: 0 | DISCHARGE

## 2025-02-17 RX ORDER — MIDODRINE HYDROCHLORIDE 5 MG/1
20 TABLET ORAL EVERY 8 HOURS
Refills: 0 | Status: DISCONTINUED | OUTPATIENT
Start: 2025-02-17 | End: 2025-02-19

## 2025-02-17 RX ORDER — ORAL SEMAGLUTIDE 14 MG/1
1 TABLET ORAL
Refills: 0 | DISCHARGE

## 2025-02-17 RX ORDER — METHOTREXATE 25 MG/ML
1 INJECTION, SOLUTION INTRA-ARTERIAL; INTRAMUSCULAR; INTRATHECAL; INTRAVENOUS
Refills: 0 | DISCHARGE

## 2025-02-17 RX ORDER — INFLUENZA A VIRUS A/IDAHO/07/2018 (H1N1) ANTIGEN (MDCK CELL DERIVED, PROPIOLACTONE INACTIVATED, INFLUENZA A VIRUS A/INDIANA/08/2018 (H3N2) ANTIGEN (MDCK CELL DERIVED, PROPIOLACTONE INACTIVATED), INFLUENZA B VIRUS B/SINGAPORE/INFTT-16-0610/2016 ANTIGEN (MDCK CELL DERIVED, PROPIOLACTONE INACTIVATED), INFLUENZA B VIRUS B/IOWA/06/2017 ANTIGEN (MDCK CELL DERIVED, PROPIOLACTONE INACTIVATED) 15; 15; 15; 15 UG/.5ML; UG/.5ML; UG/.5ML; UG/.5ML
0.5 INJECTION, SUSPENSION INTRAMUSCULAR ONCE
Refills: 0 | Status: DISCONTINUED | OUTPATIENT
Start: 2025-02-17 | End: 2025-02-19

## 2025-02-17 RX ORDER — LATANOPROST PF 0.05 MG/ML
1 SOLUTION/ DROPS OPHTHALMIC AT BEDTIME
Refills: 0 | Status: DISCONTINUED | OUTPATIENT
Start: 2025-02-17 | End: 2025-02-19

## 2025-02-17 RX ADMIN — INSULIN LISPRO 2: 100 INJECTION, SOLUTION INTRAVENOUS; SUBCUTANEOUS at 05:28

## 2025-02-17 RX ADMIN — Medication 100 GRAM(S): at 11:13

## 2025-02-17 RX ADMIN — FOLIC ACID 1 MILLIGRAM(S): 1 TABLET ORAL at 11:12

## 2025-02-17 RX ADMIN — NOREPINEPHRINE BITARTRATE 12.6 MICROGRAM(S)/KG/MIN: 8 SOLUTION at 03:23

## 2025-02-17 RX ADMIN — Medication 50 MILLIGRAM(S): at 11:11

## 2025-02-17 RX ADMIN — Medication 25 GRAM(S): at 21:42

## 2025-02-17 RX ADMIN — HEPARIN SODIUM 7500 UNIT(S): 1000 INJECTION INTRAVENOUS; SUBCUTANEOUS at 21:42

## 2025-02-17 RX ADMIN — SODIUM CHLORIDE 75 MILLILITER(S): 9 INJECTION, SOLUTION INTRAVENOUS at 08:05

## 2025-02-17 RX ADMIN — ROSUVASTATIN CALCIUM 5 MILLIGRAM(S): 20 TABLET, FILM COATED ORAL at 21:55

## 2025-02-17 RX ADMIN — TAMSULOSIN HYDROCHLORIDE 0.4 MILLIGRAM(S): 0.4 CAPSULE ORAL at 21:43

## 2025-02-17 RX ADMIN — GABAPENTIN 400 MILLIGRAM(S): 400 CAPSULE ORAL at 05:28

## 2025-02-17 RX ADMIN — SODIUM CHLORIDE 75 MILLILITER(S): 9 INJECTION, SOLUTION INTRAVENOUS at 06:51

## 2025-02-17 RX ADMIN — HEPARIN SODIUM 7500 UNIT(S): 1000 INJECTION INTRAVENOUS; SUBCUTANEOUS at 13:19

## 2025-02-17 RX ADMIN — Medication 50 MICROGRAM(S): at 05:29

## 2025-02-17 RX ADMIN — Medication 1 TABLET(S): at 11:12

## 2025-02-17 RX ADMIN — INSULIN GLARGINE-YFGN 10 UNIT(S): 100 INJECTION, SOLUTION SUBCUTANEOUS at 08:05

## 2025-02-17 RX ADMIN — Medication 1 TABLET(S): at 11:13

## 2025-02-17 RX ADMIN — MIDODRINE HYDROCHLORIDE 20 MILLIGRAM(S): 5 TABLET ORAL at 21:43

## 2025-02-17 RX ADMIN — NOREPINEPHRINE BITARTRATE 12.6 MICROGRAM(S)/KG/MIN: 8 SOLUTION at 00:23

## 2025-02-17 RX ADMIN — Medication 1 APPLICATION(S): at 05:29

## 2025-02-17 RX ADMIN — Medication 325 MILLIGRAM(S): at 11:12

## 2025-02-17 RX ADMIN — INSULIN LISPRO 8: 100 INJECTION, SOLUTION INTRAVENOUS; SUBCUTANEOUS at 16:00

## 2025-02-17 RX ADMIN — SODIUM CHLORIDE 1000 MILLILITER(S): 9 INJECTION, SOLUTION INTRAVENOUS at 00:35

## 2025-02-17 RX ADMIN — TIZANIDINE 2 MILLIGRAM(S): 4 TABLET ORAL at 21:43

## 2025-02-17 RX ADMIN — INSULIN LISPRO 8: 100 INJECTION, SOLUTION INTRAVENOUS; SUBCUTANEOUS at 11:10

## 2025-02-17 RX ADMIN — GABAPENTIN 400 MILLIGRAM(S): 400 CAPSULE ORAL at 17:19

## 2025-02-17 RX ADMIN — SODIUM CHLORIDE 75 MILLILITER(S): 9 INJECTION, SOLUTION INTRAVENOUS at 11:11

## 2025-02-17 RX ADMIN — TIZANIDINE 2 MILLIGRAM(S): 4 TABLET ORAL at 13:19

## 2025-02-17 RX ADMIN — Medication 500000 UNIT(S): at 17:19

## 2025-02-17 RX ADMIN — SODIUM CHLORIDE 75 MILLILITER(S): 9 INJECTION, SOLUTION INTRAVENOUS at 17:20

## 2025-02-17 RX ADMIN — LATANOPROST PF 1 DROP(S): 0.05 SOLUTION/ DROPS OPHTHALMIC at 21:54

## 2025-02-17 RX ADMIN — INSULIN LISPRO 6: 100 INJECTION, SOLUTION INTRAVENOUS; SUBCUTANEOUS at 21:38

## 2025-02-17 RX ADMIN — Medication 40 MILLIGRAM(S): at 06:51

## 2025-02-17 RX ADMIN — Medication 50 MILLIGRAM(S): at 03:22

## 2025-02-17 RX ADMIN — INSULIN LISPRO 10 UNIT(S): 100 INJECTION, SOLUTION INTRAVENOUS; SUBCUTANEOUS at 16:47

## 2025-02-17 RX ADMIN — Medication 50 MILLIGRAM(S): at 17:19

## 2025-02-17 RX ADMIN — Medication 50 MILLIGRAM(S): at 05:29

## 2025-02-17 RX ADMIN — NOREPINEPHRINE BITARTRATE 12.6 MICROGRAM(S)/KG/MIN: 8 SOLUTION at 08:05

## 2025-02-17 RX ADMIN — TIZANIDINE 2 MILLIGRAM(S): 4 TABLET ORAL at 05:29

## 2025-02-17 RX ADMIN — GABAPENTIN 600 MILLIGRAM(S): 400 CAPSULE ORAL at 21:42

## 2025-02-17 RX ADMIN — MIDODRINE HYDROCHLORIDE 20 MILLIGRAM(S): 5 TABLET ORAL at 11:12

## 2025-02-17 RX ADMIN — Medication 25 GRAM(S): at 13:19

## 2025-02-17 RX ADMIN — Medication 25 GRAM(S): at 05:29

## 2025-02-17 RX ADMIN — Medication 250 MILLIGRAM(S): at 00:03

## 2025-02-17 RX ADMIN — SODIUM CHLORIDE 1000 MILLILITER(S): 9 INJECTION, SOLUTION INTRAVENOUS at 11:11

## 2025-02-17 RX ADMIN — INSULIN GLARGINE-YFGN 30 UNIT(S): 100 INJECTION, SOLUTION SUBCUTANEOUS at 16:47

## 2025-02-17 RX ADMIN — MIDODRINE HYDROCHLORIDE 20 MILLIGRAM(S): 5 TABLET ORAL at 17:20

## 2025-02-17 NOTE — PATIENT PROFILE ADULT - TOBACCO CESSATION EDUCATION/COUNSELLING(PROVIDED IF TOBACCO USED IN THE PAST 30 DAYS- CORE MEASURE SITES)
[FreeTextEntry1] : CC: nephrolithiasis, BPH, PSA screening\par \par Patient doing well.  No colic or stone related complaints.  \par \par BPH: no voiding complaints, no straining.  PVR 0cc today.  \par \par Due for PSA.  Pathology from [facility] was benign\par \par SOCIAL: nonsmoker\par ROS: negative CV, resp, GI \par FAMHX: negative CAP\par SURG: no change/recent \par 
Offered and patient declined

## 2025-02-17 NOTE — PROGRESS NOTE ADULT - SUBJECTIVE AND OBJECTIVE BOX
Patient seen and examined bedside resting comfortably.  No complaints offered.   Denies nausea, vomiting, diarrhea, fevers, chills. Tolerating diet.        T(F): 97.9 (02-17-25 @ 07:26), Max: 102.9 (02-16-25 @ 17:14)  HR: 77 (02-17-25 @ 11:00) (65 - 124)  BP: 119/78 (02-17-25 @ 11:00) (83/58 - 132/95)  RR: 17 (02-17-25 @ 11:00) (10 - 32)  SpO2: 96% (02-17-25 @ 11:00) (91% - 100%)  Wt(kg): --  CAPILLARY BLOOD GLUCOSE      POCT Blood Glucose.: 302 mg/dL (17 Feb 2025 11:09)  POCT Blood Glucose.: 185 mg/dL (17 Feb 2025 08:01)  POCT Blood Glucose.: 162 mg/dL (17 Feb 2025 05:24)      PHYSICAL EXAM:  General: NAD  Neuro:  Alert & oriented x 3  HEENT: NCAT, EOMI, conjunctiva clear  Lung:respirations nonlabored, good inspiratory effort  Abdomen: protruberant soft, NTND  : cath 18fr in place draining clear yellow urine in palmer tubing. 900cc/12hrs    LABS:                        12.8   33.41 )-----------( 194      ( 17 Feb 2025 06:30 )             41.4     02-17    138  |  103  |  39[H]  ----------------------------<  156[H]  4.4   |  24  |  2.05[H]    Ca    8.9      17 Feb 2025 06:30  Phos  5.4     02-17  Mg     1.8     02-17    TPro  6.2  /  Alb  2.2[L]  /  TBili  0.7  /  DBili  x   /  AST  12[L]  /  ALT  44  /  AlkPhos  78  02-17    PT/INR - ( 16 Feb 2025 18:04 )   PT: 16.1 sec;   INR: 1.43 ratio         PTT - ( 16 Feb 2025 18:04 )  PTT:26.2 sec    I&O:     A/P: 53 yo obese M pmhx of transverse myelitis, paraplegic, chronic palmer 2/2 neurogenic bladder, htn, hld admitted to ICU in septic shock pressors 2/2 urosepsis with emphysematous left pyelonephritis, ESEQUIEL  febrile in ED lastnight to 102.9. lactate of 6, now downtrending to 2.2. Cr downtrending  leukocytosis uptrending wbc 33, likely reactive   -c/w zosyn, Follow up [facility]  -c/w IVF resuscitation, trend Cr  -continue care per icu  -case discussed with Dr Evans

## 2025-02-17 NOTE — PATIENT PROFILE ADULT - FUNCTIONAL ASSESSMENT - BASIC MOBILITY 6.
2-calculated by average/Not able to assess (calculate score using [facility] averaging method)

## 2025-02-17 NOTE — PATIENT PROFILE ADULT - FALL HARM RISK - HARM RISK INTERVENTIONS

## 2025-02-17 NOTE — PROGRESS NOTE ADULT - SUBJECTIVE AND OBJECTIVE BOX
CHIEF COMPLAINT:    Interval Events:    REVIEW OF SYSTEMS:  Constitutional: [ ] fevers [ ] chills [ ] weight loss [ ] weight gain  HEENT: [ ] dry eyes [ ] eye irritation [ ] postnasal drip [ ] nasal congestion  CV: [ ] chest pain [ ] orthopnea [ ] palpitations [ ] murmur  Resp: [ ] cough [ ] shortness of breath [ ] dyspnea [ ] wheezing [ ] sputum [ ] hemoptysis  GI: [ ] nausea [ ] vomiting [ ] diarrhea [ ] constipation [ ] abd pain [ ] dysphagia   : [ ] dysuria [ ] nocturia [ ] hematuria [ ] increased urinary frequency  Musculoskeletal: [ ] back pain [ ] myalgias [ ] arthralgias [ ] fracture  Skin: [ ] rash [ ] itch  Neurological: [ ] headache [ ] dizziness [ ] syncope [ ] weakness [ ] numbness  Hematologic/Lymphatic: [ ] anemia [ ] bleeding problem  Allergic/Immunologic: [ ] itchy eyes [ ] nasal discharge [ ] hives [ ] angioedema  [ ] All other systems negative  [ ] Unable to assess ROS because ________    OBJECTIVE:  ICU Vital Signs Last 24 Hrs  T(C): 36.6 (2025 07:26), Max: 39.4 (2025 17:14)  T(F): 97.9 (2025 07:26), Max: 102.9 (2025 17:14)  HR: 79 (2025 07:05) (65 - 124)  BP: 115/69 (2025 07:05) (83/58 - 132/95)  BP(mean): 83 (2025 07:05) (62 - 101)  ABP: --  ABP(mean): --  RR: 14 (2025 07:05) (10 - 32)  SpO2: 91% (2025 07:05) (91% - 100%)    O2 Parameters below as of 2025 01:35  Patient On (Oxygen Delivery Method): BiPAP/CPAP              16 @ 07:01  -  02-17 @ 07:00  --------------------------------------------------------  IN: 332.1 mL / OUT: 710 mL / NET: -377.9 mL      CAPILLARY BLOOD GLUCOSE      POCT Blood Glucose.: 162 mg/dL (2025 05:24)      PHYSICAL EXAM:  General:   HEENT:   Neck:   Respiratory:   Cardiovascular:   Abdomen:   Extremities:   Skin:   Neurological:  Psychiatry:    LINES:    HOSPITAL MEDICATIONS:  MEDICATIONS  (STANDING):  chlorhexidine 2% Cloths 1 Application(s) Topical <User Schedule>  ferrous    sulfate 325 milliGRAM(s) Oral daily  folic acid 1 milliGRAM(s) Oral daily  gabapentin 600 milliGRAM(s) Oral at bedtime  gabapentin 400 milliGRAM(s) Oral two times a day  hydrocortisone sodium succinate Injectable 50 milliGRAM(s) IV Push every 6 hours  influenza   Vaccine 0.5 milliLiter(s) IntraMuscular once  insulin glargine Injectable (LANTUS) 10 Unit(s) SubCutaneous every morning  lactated ringers. 1000 milliLiter(s) (75 mL/Hr) IV Continuous <Continuous>  latanoprost 0.005% Ophthalmic Solution 1 Drop(s) Both EYES at bedtime  levothyroxine 50 MICROGram(s) Oral daily  multivitamin 1 Tablet(s) Oral daily  nicotine -  14 mG/24Hr(s) Patch 1 Patch Transdermal daily  norepinephrine Infusion 0.05 MICROgram(s)/kG/Min (12.6 mL/Hr) IV Continuous <Continuous>  pantoprazole    Tablet 40 milliGRAM(s) Oral before breakfast  piperacillin/tazobactam IVPB.. 3.375 Gram(s) IV Intermittent every 8 hours  rosuvastatin 5 milliGRAM(s) Oral at bedtime  tamsulosin 0.4 milliGRAM(s) Oral at bedtime  tiZANidine 2 milliGRAM(s) Oral every 8 hours    MEDICATIONS  (PRN):      LABS:                        12.8   33.41 )-----------( 194      ( 2025 06:30 )             41.4     Hgb Trend: 12.8<--, 14.5<--  02-16    136  |  99  |  38[H]  ----------------------------<  282[H]  3.7   |  25  |  2.20[H]    Ca    9.5      2025 18:04    TPro  6.8  /  Alb  2.6[L]  /  TBili  0.4  /  DBili  x   /  AST  21  /  ALT  55  /  AlkPhos  71  02-16    PT/INR - ( 2025 18:04 )   PT: 16.1 sec;   INR: 1.43 ratio         PTT - ( 2025 18:04 )  PTT:26.2 sec  Urinalysis Basic - ( 2025 18:04 )    Color: Orange / Appearance: Turbid / S.021 / pH: x  Gluc: 282 mg/dL / Ketone: Negative mg/dL  / Bili: Negative / Urobili: 1.0 mg/dL   Blood: x / Protein: >=1000 mg/dL / Nitrite: Negative   Leuk Esterase: Moderate / RBC: Too Numerous to count /HPF / WBC 20 /HPF   Sq Epi: x / Non Sq Epi: x / Bacteria: Moderate /HPF            MICROBIOLOGY:     RADIOLOGY:  [ ] Reviewed and interpreted by me CHIEF COMPLAINT:    Interval Events:  remains on pressors  hematuria clearing up  feeling well, no complaints    REVIEW OF SYSTEMS:  Constitutional: [ -] fevers [ -] chills [ ] weight loss [ ] weight gain  HEENT: [ ] dry eyes [ ] eye irritation [ ] postnasal drip [ ] nasal congestion  CV: [- ] chest pain [ -] orthopnea [- ] palpitations [ ] murmur  Resp: [ -] cough [ -] shortness of breath [- ] dyspnea [- ] wheezing [- ] sputum [ ] hemoptysis  GI: [-] nausea [ -] vomiting [ -] diarrhea [- ] constipation [- ] abd pain [ ] dysphagia   : [ ] dysuria [ ] nocturia [ ] hematuria [ ] increased urinary frequency  Musculoskeletal: [ ] back pain [ ] myalgias [ ] arthralgias [ ] fracture  Skin: [ ] rash [ ] itch  Neurological: [ ] headache [ ] dizziness [ ] syncope [ ] weakness [ ] numbness  Hematologic/Lymphatic: [ ] anemia [ ] bleeding problem  Allergic/Immunologic: [ ] itchy eyes [ ] nasal discharge [ ] hives [ ] angioedema  [x ] All other systems negative    OBJECTIVE:  ICU Vital Signs Last 24 Hrs  T(C): 36.6 (2025 07:26), Max: 39.4 (2025 17:14)  T(F): 97.9 (2025 07:26), Max: 102.9 (2025 17:14)  HR: 79 (2025 07:05) (65 - 124)  BP: 115/69 (2025 07:05) (83/58 - 132/95)  BP(mean): 83 (2025 07:05) (62 - 101)  ABP: --  ABP(mean): --  RR: 14 (2025 07:05) (10 - 32)  SpO2: 91% (2025 07:05) (91% - 100%)    O2 Parameters below as of 2025 01:35  Patient On (Oxygen Delivery Method): BiPAP/CPAP              -16 @ 07:01  -  02-17 @ 07:00  --------------------------------------------------------  IN: 332.1 mL / OUT: 710 mL / NET: -377.9 mL      CAPILLARY BLOOD GLUCOSE      POCT Blood Glucose.: 162 mg/dL (2025 05:24)      PHYSICAL EXAM:  General: NAD, non toxic appearing  HEENT: MMM, EOMI  Neck: supple  Respiratory: CTA b/l  Cardiovascular: s1s2 RRR  Abdomen: soft, non tender, non distended  Extremities: warm, trace pitting edema  Skin: multiple pressure injuries  Neurological: below the waist paralysis  Psychiatry: Regine fitzgerald    LINES:  [facility] MEDICATIONS:  MEDICATIONS  (STANDING):  chlorhexidine 2% Cloths 1 Application(s) Topical <User Schedule>  ferrous    sulfate 325 milliGRAM(s) Oral daily  folic acid 1 milliGRAM(s) Oral daily  gabapentin 600 milliGRAM(s) Oral at bedtime  gabapentin 400 milliGRAM(s) Oral two times a day  hydrocortisone sodium succinate Injectable 50 milliGRAM(s) IV Push every 6 hours  influenza   Vaccine 0.5 milliLiter(s) IntraMuscular once  insulin glargine Injectable (LANTUS) 10 Unit(s) SubCutaneous every morning  lactated ringers. 1000 milliLiter(s) (75 mL/Hr) IV Continuous <Continuous>  latanoprost 0.005% Ophthalmic Solution 1 Drop(s) Both EYES at bedtime  levothyroxine 50 MICROGram(s) Oral daily  multivitamin 1 Tablet(s) Oral daily  nicotine -  14 mG/24Hr(s) Patch 1 Patch Transdermal daily  norepinephrine Infusion 0.05 MICROgram(s)/kG/Min (12.6 mL/Hr) IV Continuous <Continuous>  pantoprazole    Tablet 40 milliGRAM(s) Oral before breakfast  piperacillin/tazobactam IVPB.. 3.375 Gram(s) IV Intermittent every 8 hours  rosuvastatin 5 milliGRAM(s) Oral at bedtime  tamsulosin 0.4 milliGRAM(s) Oral at bedtime  tiZANidine 2 milliGRAM(s) Oral every 8 hours    MEDICATIONS  (PRN):      LABS:                        12.8   33.41 )-----------( 194      ( 2025 06:30 )             41.4     Hgb Trend: 12.8<--, 14.5<--  02-16    136  |  99  |  38[H]  ----------------------------<  282[H]  3.7   |  25  |  2.20[H]    Ca    9.5      2025 18:04    TPro  6.8  /  Alb  2.6[L]  /  TBili  0.4  /  DBili  x   /  AST  21  /  ALT  55  /  AlkPhos  71      PT/INR - ( 2025 18:04 )   PT: 16.1 sec;   INR: 1.43 ratio         PTT - ( 2025 18:04 )  PTT:26.2 sec  Urinalysis Basic - ( 2025 18:04 )    Color: Orange / Appearance: Turbid / S.021 / pH: x  Gluc: 282 mg/dL / Ketone: Negative mg/dL  / Bili: Negative / Urobili: 1.0 mg/dL   Blood: x / Protein: >=1000 mg/dL / Nitrite: Negative   Leuk Esterase: Moderate / RBC: Too Numerous to count /HPF / WBC 20 /HPF   Sq Epi: x / Non Sq Epi: x / Bacteria: Moderate /HPF            MICROBIOLOGY:     RADIOLOGY:  [ ] Reviewed and interpreted by me    < from: CT Abdomen and Pelvis w/ IV Cont (25 @ 20:08) >  FINDINGS:  LOWER CHEST: Within normal limits.    LIVER: Within normal limits.  BILE DUCTS: Normal caliber.  GALLBLADDER: Cholecystectomy.  SPLEEN: Within normal limits.  PANCREAS: Within normal limits.  ADRENALS: Within normal limits.  KIDNEYS/URETERS: Mild right hydronephrosis with distention of the   extrarenal pelvis with gas and debris. Mild right perinephric stranding   extending along the posterior pararenal space into the pelvis.   Thick-walled left extrarenal pelvis contains gas. No left-sided   hydronephrosis. Symmetric bilateral renal enhancement. Left upper pole   cyst.    BLADDER: Decompressed by Tracey catheter.  REPRODUCTIVE ORGANS: Prostate within normal limits.    BOWEL: No bowel obstruction. Appendix is normal.  PERITONEUM/RETROPERITONEUM: Within normal limits.  VESSELS: Within normal limits.  LYMPH NODES: No lymphadenopathy.  ABDOMINAL WALL: Moderate fat-containing umbilical hernia.  BONES: Moderate superior endplate compression deformity at L2, likely   chronic. Degenerative changes of the spine.    IMPRESSION:  Gas and debris within the renal collecting systems along with thickening   of the left renal pelvis. 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 Please correlate with urinalysis.    Mild hydronephrosis of the right kidney for which urology consult is   advised.      < end of copied text >  < from: CT Abdomen and Pelvis w/ IV Cont (25 @ 20:08) >  No right-sided renal calculi are seen. In the lower pole of the left   kidney there are round hyperdense foci measuring up to 6 mm that may   represent intrarenal stones. No hydronephrosis of the left kidney is seen.    Of note, the patient has a Tracey catheter with trace amount of air in the   urinary bladder and therefore the differential for the air in the renal   collecting systems could be iatrogenic, however, given the presence of   debris in the collecting system, emphysematous pyelitis should be   excluded.    < end of copied text >

## 2025-02-17 NOTE — CHART NOTE - NSCHARTNOTEFT_GEN_A_CORE
:  Paige    INDICATION:  shock    PROCEDURE:  [ x] LIMITED ECHO  [ x] LIMITED CHEST  [ ] LIMITED RETROPERITONEAL  [ ] LIMITED ABDOMINAL  [ ] LIMITED DVT  [ ] NEEDLE GUIDANCE VASCULAR  [ ] NEEDLE GUIDANCE THORACENTESIS  [ ] NEEDLE GUIDANCE PARACENTESIS  [ ] NEEDLE GUIDANCE PERICARDIOCENTESIS  [ ] OTHER    FINDINGS:  Chest - A lines anteriorly, posterior-laterally no consolidations  Echo- poor windows but overall normal LV systolic function, no pericardial effusion, unable to visualize R heart, IVC is small to indeterminate w/ respiratory variation    INTERPRETATION:  Give additional IVF in setting of shock and IVC w/ variability, continue to titrate pressors     Images stored in Q path

## 2025-02-18 ENCOUNTER — TRANSCRIPTION ENCOUNTER (OUTPATIENT)
Age: 55
End: 2025-02-18

## 2025-02-18 LAB
A1C WITH ESTIMATED AVERAGE GLUCOSE RESULT: 10.4 % — HIGH (ref 4–5.6)
ALBUMIN SERPL ELPH-MCNC: 2 G/DL — LOW (ref 3.3–5)
ALP SERPL-CCNC: 106 U/L — SIGNIFICANT CHANGE UP (ref 40–120)
ALT FLD-CCNC: 39 U/L — SIGNIFICANT CHANGE UP (ref 12–78)
ANION GAP SERPL CALC-SCNC: 8 MMOL/L — SIGNIFICANT CHANGE UP (ref 5–17)
AST SERPL-CCNC: 16 U/L — SIGNIFICANT CHANGE UP (ref 15–37)
BASOPHILS # BLD AUTO: 0.15 K/UL — SIGNIFICANT CHANGE UP (ref 0–0.2)
BASOPHILS NFR BLD AUTO: 0.6 % — SIGNIFICANT CHANGE UP (ref 0–2)
BILIRUB SERPL-MCNC: 0.3 MG/DL — SIGNIFICANT CHANGE UP (ref 0.2–1.2)
BUN SERPL-MCNC: 32 MG/DL — HIGH (ref 7–23)
CALCIUM SERPL-MCNC: 8.9 MG/DL — SIGNIFICANT CHANGE UP (ref 8.5–10.1)
CHLORIDE SERPL-SCNC: 105 MMOL/L — SIGNIFICANT CHANGE UP (ref 96–108)
CO2 SERPL-SCNC: 27 MMOL/L — SIGNIFICANT CHANGE UP (ref 22–31)
CREAT SERPL-MCNC: 1.46 MG/DL — HIGH (ref 0.5–1.3)
EGFR: 57 ML/MIN/1.73M2 — LOW
EOSINOPHIL # BLD AUTO: 0.04 K/UL — SIGNIFICANT CHANGE UP (ref 0–0.5)
EOSINOPHIL NFR BLD AUTO: 0.2 % — SIGNIFICANT CHANGE UP (ref 0–6)
ESTIMATED AVERAGE GLUCOSE: 252 MG/DL — HIGH (ref 68–114)
GLUCOSE BLDC GLUCOMTR-MCNC: 176 MG/DL — HIGH (ref 70–99)
GLUCOSE BLDC GLUCOMTR-MCNC: 177 MG/DL — HIGH (ref 70–99)
GLUCOSE BLDC GLUCOMTR-MCNC: 216 MG/DL — HIGH (ref 70–99)
GLUCOSE BLDC GLUCOMTR-MCNC: 230 MG/DL — HIGH (ref 70–99)
GLUCOSE SERPL-MCNC: 203 MG/DL — HIGH (ref 70–99)
HCT VFR BLD CALC: 34 % — LOW (ref 39–50)
HGB BLD-MCNC: 10.6 G/DL — LOW (ref 13–17)
IMM GRANULOCYTES NFR BLD AUTO: 2.8 % — HIGH (ref 0–0.9)
LYMPHOCYTES # BLD AUTO: 1.45 K/UL — SIGNIFICANT CHANGE UP (ref 1–3.3)
LYMPHOCYTES # BLD AUTO: 5.8 % — LOW (ref 13–44)
MAGNESIUM SERPL-MCNC: 2.5 MG/DL — SIGNIFICANT CHANGE UP (ref 1.6–2.6)
MCHC RBC-ENTMCNC: 27.9 PG — SIGNIFICANT CHANGE UP (ref 27–34)
MCHC RBC-ENTMCNC: 31.2 G/DL — LOW (ref 32–36)
MCV RBC AUTO: 89.5 FL — SIGNIFICANT CHANGE UP (ref 80–100)
MONOCYTES # BLD AUTO: 0.62 K/UL — SIGNIFICANT CHANGE UP (ref 0–0.9)
MONOCYTES NFR BLD AUTO: 2.5 % — SIGNIFICANT CHANGE UP (ref 2–14)
NEUTROPHILS # BLD AUTO: 21.95 K/UL — HIGH (ref 1.8–7.4)
NEUTROPHILS NFR BLD AUTO: 88.1 % — HIGH (ref 43–77)
NRBC BLD AUTO-RTO: 0 /100 WBCS — SIGNIFICANT CHANGE UP (ref 0–0)
PHOSPHATE SERPL-MCNC: 4 MG/DL — SIGNIFICANT CHANGE UP (ref 2.5–4.5)
PLATELET # BLD AUTO: 168 K/UL — SIGNIFICANT CHANGE UP (ref 150–400)
POTASSIUM SERPL-MCNC: 3.6 MMOL/L — SIGNIFICANT CHANGE UP (ref 3.5–5.3)
POTASSIUM SERPL-SCNC: 3.6 MMOL/L — SIGNIFICANT CHANGE UP (ref 3.5–5.3)
PROCALCITONIN SERPL-MCNC: 63.2 NG/ML — HIGH (ref 0.02–0.1)
PROT SERPL-MCNC: 5.7 GM/DL — LOW (ref 6–8.3)
RBC # BLD: 3.8 M/UL — LOW (ref 4.2–5.8)
RBC # FLD: 16.2 % — HIGH (ref 10.3–14.5)
SODIUM SERPL-SCNC: 140 MMOL/L — SIGNIFICANT CHANGE UP (ref 135–145)
WBC # BLD: 24.9 K/UL — HIGH (ref 3.8–10.5)
WBC # FLD AUTO: 24.9 K/UL — HIGH (ref 3.8–10.5)

## 2025-02-18 PROCEDURE — 99233 SBSQ HOSP IP/OBS HIGH 50: CPT

## 2025-02-18 PROCEDURE — 99232 SBSQ HOSP IP/OBS MODERATE 35: CPT

## 2025-02-18 RX ORDER — PREDNISONE 20 MG/1
60 TABLET ORAL DAILY
Refills: 0 | Status: DISCONTINUED | OUTPATIENT
Start: 2025-02-19 | End: 2025-02-19

## 2025-02-18 RX ORDER — PIPERACILLIN-TAZO-DEXTROSE,ISO 3.375G/5
4.5 IV SOLUTION, PIGGYBACK PREMIX FROZEN(ML) INTRAVENOUS EVERY 8 HOURS
Refills: 0 | Status: DISCONTINUED | OUTPATIENT
Start: 2025-02-18 | End: 2025-02-19

## 2025-02-18 RX ADMIN — TAMSULOSIN HYDROCHLORIDE 0.4 MILLIGRAM(S): 0.4 CAPSULE ORAL at 21:12

## 2025-02-18 RX ADMIN — HEPARIN SODIUM 7500 UNIT(S): 1000 INJECTION INTRAVENOUS; SUBCUTANEOUS at 05:05

## 2025-02-18 RX ADMIN — ROSUVASTATIN CALCIUM 5 MILLIGRAM(S): 20 TABLET, FILM COATED ORAL at 21:13

## 2025-02-18 RX ADMIN — Medication 325 MILLIGRAM(S): at 11:13

## 2025-02-18 RX ADMIN — Medication 1 APPLICATION(S): at 05:05

## 2025-02-18 RX ADMIN — HEPARIN SODIUM 7500 UNIT(S): 1000 INJECTION INTRAVENOUS; SUBCUTANEOUS at 21:13

## 2025-02-18 RX ADMIN — Medication 25 GRAM(S): at 13:08

## 2025-02-18 RX ADMIN — Medication 500000 UNIT(S): at 17:16

## 2025-02-18 RX ADMIN — INSULIN GLARGINE-YFGN 40 UNIT(S): 100 INJECTION, SOLUTION SUBCUTANEOUS at 07:52

## 2025-02-18 RX ADMIN — Medication 40 MILLIEQUIVALENT(S): at 06:41

## 2025-02-18 RX ADMIN — Medication 1 TABLET(S): at 11:13

## 2025-02-18 RX ADMIN — MIDODRINE HYDROCHLORIDE 20 MILLIGRAM(S): 5 TABLET ORAL at 13:08

## 2025-02-18 RX ADMIN — Medication 25 GRAM(S): at 05:05

## 2025-02-18 RX ADMIN — SODIUM CHLORIDE 75 MILLILITER(S): 9 INJECTION, SOLUTION INTRAVENOUS at 07:53

## 2025-02-18 RX ADMIN — FOLIC ACID 1 MILLIGRAM(S): 1 TABLET ORAL at 11:13

## 2025-02-18 RX ADMIN — MIDODRINE HYDROCHLORIDE 20 MILLIGRAM(S): 5 TABLET ORAL at 21:12

## 2025-02-18 RX ADMIN — Medication 25 GRAM(S): at 22:09

## 2025-02-18 RX ADMIN — LATANOPROST PF 1 DROP(S): 0.05 SOLUTION/ DROPS OPHTHALMIC at 22:24

## 2025-02-18 RX ADMIN — INSULIN LISPRO 2: 100 INJECTION, SOLUTION INTRAVENOUS; SUBCUTANEOUS at 21:13

## 2025-02-18 RX ADMIN — TIZANIDINE 2 MILLIGRAM(S): 4 TABLET ORAL at 13:08

## 2025-02-18 RX ADMIN — INSULIN LISPRO 10 UNIT(S): 100 INJECTION, SOLUTION INTRAVENOUS; SUBCUTANEOUS at 11:10

## 2025-02-18 RX ADMIN — HEPARIN SODIUM 7500 UNIT(S): 1000 INJECTION INTRAVENOUS; SUBCUTANEOUS at 13:08

## 2025-02-18 RX ADMIN — Medication 500000 UNIT(S): at 05:04

## 2025-02-18 RX ADMIN — INSULIN LISPRO 4: 100 INJECTION, SOLUTION INTRAVENOUS; SUBCUTANEOUS at 11:09

## 2025-02-18 RX ADMIN — Medication 50 MILLIGRAM(S): at 05:05

## 2025-02-18 RX ADMIN — INSULIN LISPRO 10 UNIT(S): 100 INJECTION, SOLUTION INTRAVENOUS; SUBCUTANEOUS at 16:32

## 2025-02-18 RX ADMIN — INSULIN LISPRO 10 UNIT(S): 100 INJECTION, SOLUTION INTRAVENOUS; SUBCUTANEOUS at 07:53

## 2025-02-18 RX ADMIN — GABAPENTIN 600 MILLIGRAM(S): 400 CAPSULE ORAL at 21:12

## 2025-02-18 RX ADMIN — Medication 50 MILLIGRAM(S): at 00:19

## 2025-02-18 RX ADMIN — GABAPENTIN 400 MILLIGRAM(S): 400 CAPSULE ORAL at 05:04

## 2025-02-18 RX ADMIN — INSULIN LISPRO 2: 100 INJECTION, SOLUTION INTRAVENOUS; SUBCUTANEOUS at 07:52

## 2025-02-18 RX ADMIN — TIZANIDINE 2 MILLIGRAM(S): 4 TABLET ORAL at 21:12

## 2025-02-18 RX ADMIN — TIZANIDINE 2 MILLIGRAM(S): 4 TABLET ORAL at 05:04

## 2025-02-18 RX ADMIN — INSULIN LISPRO 4: 100 INJECTION, SOLUTION INTRAVENOUS; SUBCUTANEOUS at 16:32

## 2025-02-18 RX ADMIN — Medication 50 MICROGRAM(S): at 05:05

## 2025-02-18 RX ADMIN — Medication 40 MILLIGRAM(S): at 06:41

## 2025-02-18 RX ADMIN — GABAPENTIN 400 MILLIGRAM(S): 400 CAPSULE ORAL at 17:17

## 2025-02-18 NOTE — CHART NOTE - NSCHARTNOTEFT_GEN_A_CORE
Interventional Radiology Consult Note    IR consulted for PICC line placement.     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  Per patient, his palmer was replaced on 2/10, since has had some hematuria and was placed on antibiotics for concern UTI.  Pt presenting to ED w/ hematuria and lethargy. Found to have UTI as well as emphysematous cystitis/pyeltits and mild R hydronephrosis. Developed hypotension requiring pressors. Admitted to ICU for Septic shock in the setting of bacteremia w/ proteus and Urine cx growing proteus. Pt now weaned off pressors with no complications remains HD stable.     [facility] ICU contacted [facility] regarding transfer to their facility to finish course of IV abx, however was informed that pt would be able to return to [facility] if PICC line is placed to finish his IV abx. If unable to get PICC placed then was told to [facility] and at that time pt would be accepted for transfer to [facility].  IR consulted for PICC line placement.     Case reviewed, and discussed with ID Dr. Castañeda. Patient with most recent blood cultures positive on 2/16, WBC today 24.9k.   Would not recommend PICC line placement at this time given above, recommend transfer to [facility] for further care. Discussed with ICU team.   IR will sign off for now, may reconsult PRN.     Becka Cole PA-C   Interventional Radiology   Available on TEAMs

## 2025-02-18 NOTE — CHART NOTE - NSCHARTNOTEFT_GEN_A_CORE
Admitting MD: Dr. Erazo  Case discussed with: Dr. 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  Per patient, his palmer was replaced on 2/10, since has had some hematuria and was placed on antibiotics for concern UTI.  Today patient lethargic, weak prompting presentation.  In ED patient febrile to 102.9F, tachycardic to the 120s, normotensive, labs significant for wbc ~16, BUN/Cr 38/2.2, lactate 5.8, +UA, ct abd/pelvis suggestive of emphysematous cystitis and pyelitis.  ICU consult for hypotension.  Initial -140/90s now 90s/60s. Admit to ICU.  (16 Feb 2025 23:27)    Brief Hospital Course:  Pt presenting to ED w/ hematuria and lethargy. Found to have UTI as well as emphysematous cystitis/pyeltits and mild R hydronephrosis. Developed hypotension requiring pressors. Admitted to ICU for Septic shock in the setting of bacteremia w/ proteus and Urine cx growing proteus. Pt now weaned off pressors with no complications remains HD stable. Pt awake and alert this am sitting upright comfortably with no acute complaints. Admits to feeling much better.     To follow:   Neuro - awake and alert; continue gabapentin and Tizanidine  CV - shock state likely in the setting of septic shock now resolved, continue midodrine; switch stress steroids back to prednisone 60 mg for transverse myelitis. continue to maintain MAP >65  Resp: satting well on RA; CPAP nightly, continue to maintain Spo2 >92%  GI- PO diet as tolerated; continue PPI   Renal: ESEQUIEL on CKD (baseline Cr 1.1 as reported by facility as of 4/2024), likely prerenal ATN, improving, non-oliguric; monitor I/Os and electrolytes; urology f/u - hematuria appears improved, no further interventions for now  Heme - no active issues, stable H/H, DVT ppx w/ HSQ  ID- continue zosyn for coverage to UTI, blood and urine cx growing proteus; continue bactrim for PJP ppx  Endo - lantus increased for hyperglycemia, may need to increase pre-meal coverage as well, continue FS w/ ISS; continue levothyroxine    Pt at this time does not require ICU level of care and is hemodynamically stable for downgrade to medicine service. Case discussed with ICU attending and hospitalist. Admitting MD: Dr. Erazo  Case discussed with: Dr. 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  Per patient, his palmer was replaced on 2/10, since has had some hematuria and was placed on antibiotics for concern UTI.  Today patient lethargic, weak prompting presentation.  In ED patient febrile to 102.9F, tachycardic to the 120s, normotensive, labs significant for wbc ~16, BUN/Cr 38/2.2, lactate 5.8, +UA, ct abd/pelvis suggestive of emphysematous cystitis and pyelitis.  ICU consult for hypotension.  Initial -140/90s now 90s/60s. Admit to ICU.  (16 Feb 2025 23:27)    Brief Hospital Course:  Pt presenting to ED w/ hematuria and lethargy. Found to have UTI as well as emphysematous cystitis/pyeltits and mild R hydronephrosis. Developed hypotension requiring pressors. Admitted to ICU for Septic shock in the setting of bacteremia w/ proteus and Urine cx growing proteus. Pt now weaned off pressors with no complications remains HD stable. Pt awake and alert this am sitting upright comfortably with no acute complaints. Admits to feeling much better.     To follow:   Neuro - awake and alert; continue gabapentin and Tizanidine  CV - shock state likely in the setting of septic shock now resolved, continue midodrine; switch stress steroids back to prednisone 60 mg for transverse myelitis. continue to maintain MAP >65  Resp: satting well on RA; CPAP nightly, continue to maintain Spo2 >92%  GI- PO diet as tolerated; continue PPI   Renal: ESEQUIEL on CKD (baseline Cr 1.1 as reported by facility as of 4/2024), likely prerenal ATN, improving, non-oliguric; monitor I/Os and electrolytes; urology f/u - hematuria appears improved, no further interventions for now  Heme - no active issues, stable H/H, DVT ppx w/ HSQ  ID- continue zosyn for coverage to UTI, blood and urine cx growing proteus; continue bactrim for PJP ppx  Endo - lantus increased for hyperglycemia, may need to increase pre-meal coverage as well, continue FS w/ ISS; continue levothyroxine    Pt at this time does not require ICU level of care and is hemodynamically stable for downgrade to medicine service. Case discussed with ICU attending and hospitalist.    [facility] spoke with [facility] regarding transfer to their facility to finish course of IV abxs, however was informed that pt would be able to return to [facility] as long as PICC line is placed to finish his IV abxs.   Will place IR consult   If unable to get PICC placed then was told to [facility] and at that time pt would be accepted for transfer to [facility]

## 2025-02-18 NOTE — PHARMACOTHERAPY INTERVENTION NOTE - COMMENTS
Per policy, piperacillin-tazobactam dose adjusted from 3.375 G q8h to 4.5 G q8h for treatment of bacteremia secondary to UTI based on weight/improving renal clearance.

## 2025-02-18 NOTE — PROGRESS NOTE ADULT - SUBJECTIVE AND OBJECTIVE BOX
CHIEF COMPLAINT:    Interval Events:    REVIEW OF SYSTEMS:  Constitutional: [ ] fevers [ ] chills [ ] weight loss [ ] weight gain  HEENT: [ ] dry eyes [ ] eye irritation [ ] postnasal drip [ ] nasal congestion  CV: [ ] chest pain [ ] orthopnea [ ] palpitations [ ] murmur  Resp: [ ] cough [ ] shortness of breath [ ] dyspnea [ ] wheezing [ ] sputum [ ] hemoptysis  GI: [ ] nausea [ ] vomiting [ ] diarrhea [ ] constipation [ ] abd pain [ ] dysphagia   : [ ] dysuria [ ] nocturia [ ] hematuria [ ] increased urinary frequency  Musculoskeletal: [ ] back pain [ ] myalgias [ ] arthralgias [ ] fracture  Skin: [ ] rash [ ] itch  Neurological: [ ] headache [ ] dizziness [ ] syncope [ ] weakness [ ] numbness  Hematologic/Lymphatic: [ ] anemia [ ] bleeding problem  Allergic/Immunologic: [ ] itchy eyes [ ] nasal discharge [ ] hives [ ] angioedema  [ ] All other systems negative  [ ] Unable to assess ROS because ________    OBJECTIVE:  ICU Vital Signs Last 24 Hrs  T(C): 35.6 (18 Feb 2025 07:16), Max: 36.2 (17 Feb 2025 20:49)  T(F): 96 (18 Feb 2025 07:16), Max: 97.2 (17 Feb 2025 20:49)  HR: 49 (18 Feb 2025 07:00) (49 - 91)  BP: 94/62 (18 Feb 2025 07:00) (84/49 - 138/92)  BP(mean): 73 (18 Feb 2025 07:00) (59 - 106)  ABP: --  ABP(mean): --  RR: 17 (18 Feb 2025 07:00) (11 - 23)  SpO2: 98% (18 Feb 2025 07:00) (86% - 100%)    O2 Parameters below as of 18 Feb 2025 07:00  Patient On (Oxygen Delivery Method): room air              02-17 @ 07:01  -  02-18 @ 07:00  --------------------------------------------------------  IN: 3188.6 mL / OUT: 4010 mL / NET: -821.4 mL      CAPILLARY BLOOD GLUCOSE      POCT Blood Glucose.: 176 mg/dL (18 Feb 2025 07:43)      PHYSICAL EXAM:  General:   HEENT:   Neck:   Respiratory:   Cardiovascular:   Abdomen:   Extremities:   Skin:   Neurological:  Psychiatry:    LINES:    HOSPITAL MEDICATIONS:  MEDICATIONS  (STANDING):  chlorhexidine 2% Cloths 1 Application(s) Topical <User Schedule>  ferrous    sulfate 325 milliGRAM(s) Oral daily  folic acid 1 milliGRAM(s) Oral daily  gabapentin 600 milliGRAM(s) Oral at bedtime  gabapentin 400 milliGRAM(s) Oral two times a day  heparin   Injectable 7500 Unit(s) SubCutaneous every 8 hours  hydrocortisone sodium succinate Injectable 50 milliGRAM(s) IV Push every 6 hours  influenza   Vaccine 0.5 milliLiter(s) IntraMuscular once  insulin glargine Injectable (LANTUS) 40 Unit(s) SubCutaneous every morning  insulin lispro (ADMELOG) corrective regimen sliding scale   SubCutaneous Before meals and at bedtime  insulin lispro Injectable (ADMELOG) 10 Unit(s) SubCutaneous three times a day before meals  lactated ringers. 1000 milliLiter(s) (75 mL/Hr) IV Continuous <Continuous>  latanoprost 0.005% Ophthalmic Solution 1 Drop(s) Both EYES at bedtime  levothyroxine 50 MICROGram(s) Oral daily  midodrine 20 milliGRAM(s) Oral every 8 hours  multivitamin 1 Tablet(s) Oral daily  nicotine -  14 mG/24Hr(s) Patch 1 Patch Transdermal daily  norepinephrine Infusion 0.05 MICROgram(s)/kG/Min (12.6 mL/Hr) IV Continuous <Continuous>  nystatin    Suspension 499759 Unit(s) Oral two times a day  pantoprazole    Tablet 40 milliGRAM(s) Oral before breakfast  piperacillin/tazobactam IVPB.. 3.375 Gram(s) IV Intermittent every 8 hours  rosuvastatin 5 milliGRAM(s) Oral at bedtime  tamsulosin 0.4 milliGRAM(s) Oral at bedtime  tiZANidine 2 milliGRAM(s) Oral every 8 hours    MEDICATIONS  (PRN):      LABS:                        10.6   24.90 )-----------( 168      ( 18 Feb 2025 04:00 )             34.0     Hgb Trend: 10.6<--, 12.8<--, 14.5<--  02-18    140  |  105  |  32[H]  ----------------------------<  203[H]  3.6   |  27  |  1.46[H]    Ca    8.9      18 Feb 2025 04:00  Phos  4.0     02-18  Mg     2.5     02-18    TPro  5.7[L]  /  Alb  2.0[L]  /  TBili  0.3  /  DBili  x   /  AST  16  /  ALT  39  /  AlkPhos  106  02-18    PT/INR - ( 16 Feb 2025 18:04 )   PT: 16.1 sec;   INR: 1.43 ratio         PTT - ( 16 Feb 2025 18:04 )  PTT:26.2 sec  Urinalysis Basic - ( 18 Feb 2025 04:00 )    Color: x / Appearance: x / SG: x / pH: x  Gluc: 203 mg/dL / Ketone: x  / Bili: x / Urobili: x   Blood: x / Protein: x / Nitrite: x   Leuk Esterase: x / RBC: x / WBC x   Sq Epi: x / Non Sq Epi: x / Bacteria: x            MICROBIOLOGY:     RADIOLOGY:  [ ] Reviewed and interpreted by me CHIEF COMPLAINT:    Interval Events:  off norepi since 2 pm  blood cx growing GNR - proteus    REVIEW OF SYSTEMS:  Constitutional: [ -] fevers [- ] chills [ ] weight loss [ ] weight gain  HEENT: [ ] dry eyes [ ] eye irritation [ ] postnasal drip [ ] nasal congestion  CV: [- ] chest pain [- ] orthopnea [ -] palpitations [ ] murmur  Resp: [ -] cough [ -] shortness of breath [ -] dyspnea [- ] wheezing [- ] sputum [ ] hemoptysis  GI: [- ] nausea [- ] vomiting [- ] diarrhea [ -] constipation [- ] abd pain [ ] dysphagia   : [ ] dysuria [ ] nocturia [ ] hematuria [ ] increased urinary frequency  Musculoskeletal: [ ] back pain [ ] myalgias [ ] arthralgias [ ] fracture  Skin: [ ] rash [ ] itch  Neurological: [ ] headache [ ] dizziness [ ] syncope [ ] weakness [ ] numbness  Hematologic/Lymphatic: [ ] anemia [ ] bleeding problem  Allergic/Immunologic: [ ] itchy eyes [ ] nasal discharge [ ] hives [ ] angioedema  [ x] All other systems negative  [ ] Unable to assess ROS because ________    OBJECTIVE:  ICU Vital Signs Last 24 Hrs  T(C): 35.6 (18 Feb 2025 07:16), Max: 36.2 (17 Feb 2025 20:49)  T(F): 96 (18 Feb 2025 07:16), Max: 97.2 (17 Feb 2025 20:49)  HR: 49 (18 Feb 2025 07:00) (49 - 91)  BP: 94/62 (18 Feb 2025 07:00) (84/49 - 138/92)  BP(mean): 73 (18 Feb 2025 07:00) (59 - 106)  ABP: --  ABP(mean): --  RR: 17 (18 Feb 2025 07:00) (11 - 23)  SpO2: 98% (18 Feb 2025 07:00) (86% - 100%)    O2 Parameters below as of 18 Feb 2025 07:00  Patient On (Oxygen Delivery Method): room air              02-17 @ 07:01  -  02-18 @ 07:00  --------------------------------------------------------  IN: 3188.6 mL / OUT: 4010 mL / NET: -821.4 mL      CAPILLARY BLOOD GLUCOSE      POCT Blood Glucose.: 176 mg/dL (18 Feb 2025 07:43)      PHYSICAL EXAM:  General: NAD, non toxic appearing  HEENT: MMM, EOMI  Neck: supple  Respiratory: CTA b/l  Cardiovascular: s1s2 RRR  Abdomen: soft, non tender, non distended  Extremities: warm, trace pitting edema  Skin: multiple pressure injuries  Neurological: below the waist paralysis  Psychiatry: Regine fitzgerald    LINES:  [facility] MEDICATIONS:  MEDICATIONS  (STANDING):  chlorhexidine 2% Cloths 1 Application(s) Topical <User Schedule>  ferrous    sulfate 325 milliGRAM(s) Oral daily  folic acid 1 milliGRAM(s) Oral daily  gabapentin 600 milliGRAM(s) Oral at bedtime  gabapentin 400 milliGRAM(s) Oral two times a day  heparin   Injectable 7500 Unit(s) SubCutaneous every 8 hours  hydrocortisone sodium succinate Injectable 50 milliGRAM(s) IV Push every 6 hours  influenza   Vaccine 0.5 milliLiter(s) IntraMuscular once  insulin glargine Injectable (LANTUS) 40 Unit(s) SubCutaneous every morning  insulin lispro (ADMELOG) corrective regimen sliding scale   SubCutaneous Before meals and at bedtime  insulin lispro Injectable (ADMELOG) 10 Unit(s) SubCutaneous three times a day before meals  lactated ringers. 1000 milliLiter(s) (75 mL/Hr) IV Continuous <Continuous>  latanoprost 0.005% Ophthalmic Solution 1 Drop(s) Both EYES at bedtime  levothyroxine 50 MICROGram(s) Oral daily  midodrine 20 milliGRAM(s) Oral every 8 hours  multivitamin 1 Tablet(s) Oral daily  nicotine -  14 mG/24Hr(s) Patch 1 Patch Transdermal daily  norepinephrine Infusion 0.05 MICROgram(s)/kG/Min (12.6 mL/Hr) IV Continuous <Continuous>  nystatin    Suspension 767163 Unit(s) Oral two times a day  pantoprazole    Tablet 40 milliGRAM(s) Oral before breakfast  piperacillin/tazobactam IVPB.. 3.375 Gram(s) IV Intermittent every 8 hours  rosuvastatin 5 milliGRAM(s) Oral at bedtime  tamsulosin 0.4 milliGRAM(s) Oral at bedtime  tiZANidine 2 milliGRAM(s) Oral every 8 hours    MEDICATIONS  (PRN):      LABS:                        10.6   24.90 )-----------( 168      ( 18 Feb 2025 04:00 )             34.0     Hgb Trend: 10.6<--, 12.8<--, 14.5<--  02-18    140  |  105  |  32[H]  ----------------------------<  203[H]  3.6   |  27  |  1.46[H]    Ca    8.9      18 Feb 2025 04:00  Phos  4.0     02-18  Mg     2.5     02-18    TPro  5.7[L]  /  Alb  2.0[L]  /  TBili  0.3  /  DBili  x   /  AST  16  /  ALT  39  /  AlkPhos  106  02-18    PT/INR - ( 16 Feb 2025 18:04 )   PT: 16.1 sec;   INR: 1.43 ratio         PTT - ( 16 Feb 2025 18:04 )  PTT:26.2 sec  Urinalysis Basic - ( 18 Feb 2025 04:00 )    Color: x / Appearance: x / SG: x / pH: x  Gluc: 203 mg/dL / Ketone: x  / Bili: x / Urobili: x   Blood: x / Protein: x / Nitrite: x   Leuk Esterase: x / RBC: x / WBC x   Sq Epi: x / Non Sq Epi: x / Bacteria: x            MICROBIOLOGY:   Culture - Urine (02.16.25 @ 18:04)    Specimen Source: Catheterized   Culture Results:   >100,000 CFU/ml Proteus mirabilis    Culture - Blood (02.16.25 @ 18:04)    -  Proteus species: Detec   Gram Stain:   Growth in aerobic bottle: Gram Negative Rods  Growth in anaerobic bottle: Gram Negative Rods   Specimen Source: .Blood BLOOD   Organism: Blood Culture PCR   Culture Results:   Growth in aerobic bottle: Gram Negative Rods  Growth in anaerobic bottle: Gram Negative Rods  Direct identification is available within approximately 3-5  hours either by Blood Panel Multiplexed PCR or Direct  MALDI-TOF. Details: [email or web address]   Organism Identification: Blood Culture PCR   Method Type: PCR        RADIOLOGY:  [ ] Reviewed and interpreted by me

## 2025-02-18 NOTE — DISCHARGE NOTE PROVIDER - HOSPITAL COURSE
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 Found to have UTI as well as emphysematous cystitis/pyeltits and mild R hydronephrosis. Developed hypotension requiring pressors. Admitted to ICU for Septic shock in the setting of bacteremia w/ proteus and Urine cx growing proteus. Pt now weaned off pressors with no complications remains HD stable. Pt awake and alert this am sitting upright comfortably with no acute complaints. Admits to feeling much better. Downgraded to medicine service today.     To follow:  Neuro - awake and alert; continue gabapentin and Tizanidine  CV - shock state likely in the setting of septic shock now resolved, continue midodrine; switch stress steroids back to prednisone 60 mg for transverse myelitis. continue to maintain MAP >65  Resp: satting well on RA; CPAP nightly, continue to maintain Spo2 >92%  GI- PO diet as tolerated; continue PPI   Renal: ESEQUIEL on CKD (baseline Cr 1.1 as reported by facility as of 4/2024), likely prerenal ATN, improving, non-oliguric; monitor I/Os and electrolytes; urology f/u - hematuria appears improved, no further interventions for now  Heme - no active issues, stable H/H, DVT ppx w/ HSQ  ID- continue zosyn for coverage to UTI, blood and urine cx growing proteus; continue bactrim for PJP ppx  Endo - lantus increased for hyperglycemia, may need to increase pre-meal coverage as well, continue FS w/ ISS; continue levothyroxine   55 yo m pmhx morbid obesity, transverse myelitis, 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 Found to have UTI as well as emphysematous cystitis/pyeltits and mild R hydronephrosis. Developed hypotension requiring pressors. Admitted to ICU for Septic shock in the setting of bacteremia w/ proteus and Urine cx growing proteus. Pt now weaned off pressors with no complications remains HD stable. Pt awake and alert this am sitting upright comfortably with no acute complaints. Admits to feeling much better. Downgraded to medicine service today. Pt accepted to [facility] to finish course of IV abxs. Accepting MD: Kelly Romo      To follow:  Neuro - awake and alert; continue gabapentin and Tizanidine  CV - shock state likely in the setting of septic shock now resolved, continue midodrine; switch stress steroids back to prednisone 60 mg for transverse myelitis. continue to maintain MAP >65  Resp: satting well on RA; CPAP nightly, continue to maintain Spo2 >92%  GI- PO diet as tolerated; continue PPI   Renal: ESEQUIEL on CKD (baseline Cr 1.1 as reported by facility as of 4/2024), likely prerenal ATN, improving, non-oliguric; monitor I/Os and electrolytes; urology f/u - hematuria appears improved, no further interventions for now  Heme - no active issues, stable H/H, DVT ppx w/ HSQ  ID- continue zosyn for coverage to UTI, blood and urine cx growing proteus; continue bactrim for PJP ppx  Endo - lantus increased for hyperglycemia, may need to increase pre-meal coverage as well, continue FS w/ ISS; continue levothyroxine

## 2025-02-18 NOTE — DISCHARGE NOTE PROVIDER - NSDCMRMEDTOKEN_GEN_ALL_CORE_FT
acetaminophen: 1,000 milligram(s) orally every 8 hours as needed for pain  apixaban 5 mg oral tablet: 1 tab(s) orally every 12 hours  empagliflozin 25 mg oral tablet: 1 tab(s) orally once a day  ferrous sulfate: 325 milligram(s) orally once a day  folic acid: 1 milligram(s) orally once a day  furosemide 40 mg oral tablet: 1 tab(s) orally once a day  gabapentin 400 mg oral tablet: 1 tab(s) orally 2 times a day  gabapentin 600 mg oral tablet: 1 tab(s) orally once a day (at bedtime)  Insulin Degludec: 40 units SQ Daily at 7 AM  insulin regular: 16 unit(s) subcutaneous 2 times a day (before meals)  latanoprost 0.005% ophthalmic solution: 1 drop(s) in each eye once a day (at bedtime)  levothyroxine 50 mcg (0.05 mg) oral tablet: 1 tab(s) orally once a day  metFORMIN 1000 mg oral tablet: 1 tab(s) orally 2 times a day  methotrexate 10 mg oral tablet: 1 tab(s) orally once a week every Tuesday at 8 am  metoprolol succinate 50 mg oral tablet, extended release: 1 tab(s) orally once a day  multivitamin: 1 tab(s) orally once a day  Nicoderm 14 mg/24 hr transdermal film, extended release: 1 patch transdermally once a day  pantoprazole 40 mg oral delayed release tablet: 1 tab(s) orally once a day  predniSONE: 60 milligram(s) orally once a day  rosuvastatin 5 mg oral tablet: 1 tab(s) orally once a day (at bedtime)  semaglutide 7 mg oral tablet: 1 tab(s) orally once a day  sulfamethoxazole-trimethoprim 800 mg-160 mg oral tablet: 1 tab(s) orally Monday, Wednesday, and Friday  tamsulosin 0.4 mg oral capsule: 1 cap(s) orally once a day (at bedtime)  tiZANidine 2 mg oral tablet: 2 tab(s) orally every 8 hours  zinc (as acetate) 50 mg oral capsule: 1 cap(s) orally once a day

## 2025-02-18 NOTE — PROGRESS NOTE ADULT - SUBJECTIVE AND OBJECTIVE BOX
Patient seen and  examined at bedside resting comfortably.   Offers no complaints this morning, states he feels well.   Off vasopressors.   Denies fever, chills, N/V/D, CP, SOB.     Vital Signs Last 24 Hrs  T(F): 96 (02-18-25 @ 07:16), Max: 97.2 (02-17-25 @ 20:49)  HR: 78 (02-18-25 @ 09:19)  BP: 94/63 (02-18-25 @ 08:00)  RR: 13 (02-18-25 @ 08:00)  SpO2: 95% (02-18-25 @ 09:19)  POCT Blood Glucose.: 176 mg/dL (18 Feb 2025 07:43)    PHYSICAL EXAM:  GENERAL: Alert, NAD  CHEST/LUNG: Clear to auscultation bilaterally, respirations nonlabored  HEART: Regular rate and rhythm; S1 & S2 appreciated  ABDOMEN: + Bowel sounds, soft, Nontender, Nondistended  : no suprapubic tenderness or distention. Palmer catheter in place with pink lemonade tinged urine in tubing   EXTREMITIES:  no calf tenderness, No edema    I&O's Detail    17 Feb 2025 07:01  -  18 Feb 2025 07:00  --------------------------------------------------------  IN:    IV PiggyBack: 400 mL    Lactated Ringers: 1725 mL    Lactated Ringers Bolus: 1000 mL    Norepinephrine: 63.6 mL  Total IN: 3188.6 mL    OUT:    Indwelling Catheter - Urethral (mL): 4010 mL  Total OUT: 4010 mL    Total NET: -821.4 mL      18 Feb 2025 07:01  -  18 Feb 2025 10:32  --------------------------------------------------------  IN:    Lactated Ringers: 75 mL  Total IN: 75 mL    OUT:  Total OUT: 0 mL    Total NET: 75 mL          LABS:                        10.6   24.90 )-----------( 168      ( 18 Feb 2025 04:00 )             34.0     02-18    140  |  105  |  32[H]  ----------------------------<  203[H]  3.6   |  27  |  1.46[H]    Ca    8.9      18 Feb 2025 04:00  Phos  4.0     02-18  Mg     2.5     02-18    TPro  5.7[L]  /  Alb  2.0[L]  /  TBili  0.3  /  DBili  x   /  AST  16  /  ALT  39  /  AlkPhos  106  02-18    PT/INR - ( 16 Feb 2025 18:04 )   PT: 16.1 sec;   INR: 1.43 ratio         PTT - ( 16 Feb 2025 18:04 )  PTT:26.2 sec    RADIOLOGY & ADDITIONAL STUDIES:    A/P  53 yo obese M pmhx of transverse myelitis, paraplegic, h/o ureteral stricture, chronic palmer since October 2024 2/2 neurogenic bladder, htn, hld admitted to ICU in septic shock pressors 2/2 urosepsis with emphysematous left pyelonephritis, ESEQUIEL  febrile in ED  to 102.9. lactate of 6, now downtrending to 2.6  WBC and Cr downtrending. Off vasopressors   - continue zosyn, f/u final cutlures   - continue IVF resuscitation   - trend Cr Follow up Bcx  - monitor palmer output  - continue care per CCU team   - d/w Dr. Avina  Patient seen and  examined at bedside resting comfortably.   Offers no complaints this morning, states he feels well.   Off vasopressors.   Denies fever, chills, N/V/D, CP, SOB.     Vital Signs Last 24 Hrs  T(F): 96 (02-18-25 @ 07:16), Max: 97.2 (02-17-25 @ 20:49)  HR: 78 (02-18-25 @ 09:19)  BP: 94/63 (02-18-25 @ 08:00)  RR: 13 (02-18-25 @ 08:00)  SpO2: 95% (02-18-25 @ 09:19)  POCT Blood Glucose.: 176 mg/dL (18 Feb 2025 07:43)    PHYSICAL EXAM:  GENERAL: Alert, NAD  CHEST/LUNG: Clear to auscultation bilaterally, respirations nonlabored  HEART: Regular rate and rhythm; S1 & S2 appreciated  ABDOMEN: + Bowel sounds, soft, Nontender, Nondistended  : no suprapubic tenderness or distention. Palmer catheter in place with pink lemonade tinged urine in tubing   EXTREMITIES:  no calf tenderness, No edema    I&O's Detail    17 Feb 2025 07:01  -  18 Feb 2025 07:00  --------------------------------------------------------  IN:    IV PiggyBack: 400 mL    Lactated Ringers: 1725 mL    Lactated Ringers Bolus: 1000 mL    Norepinephrine: 63.6 mL  Total IN: 3188.6 mL    OUT:    Indwelling Catheter - Urethral (mL): 4010 mL  Total OUT: 4010 mL    Total NET: -821.4 mL      18 Feb 2025 07:01  -  18 Feb 2025 10:32  --------------------------------------------------------  IN:    Lactated Ringers: 75 mL  Total IN: 75 mL    OUT:  Total OUT: 0 mL    Total NET: 75 mL          LABS:                        10.6   24.90 )-----------( 168      ( 18 Feb 2025 04:00 )             34.0     02-18    140  |  105  |  32[H]  ----------------------------<  203[H]  3.6   |  27  |  1.46[H]    Ca    8.9      18 Feb 2025 04:00  Phos  4.0     02-18  Mg     2.5     02-18    TPro  5.7[L]  /  Alb  2.0[L]  /  TBili  0.3  /  DBili  x   /  AST  16  /  ALT  39  /  AlkPhos  106  02-18    PT/INR - ( 16 Feb 2025 18:04 )   PT: 16.1 sec;   INR: 1.43 ratio         PTT - ( 16 Feb 2025 18:04 )  PTT:26.2 sec    RADIOLOGY & ADDITIONAL STUDIES:    A/P  55 yo obese M pmhx of transverse myelitis, paraplegic, h/o Right ureteral stricture and urethral stricture, chronic palmer since October 2024 2/2 neurogenic bladder, htn, hld admitted to ICU in septic shock pressors 2/2 urosepsis with emphysematous left pyelonephritis, ESEQUIEL  febrile in ED  to 102.9. lactate of 6, now downtrending to 2.6  WBC and Cr downtrending. Off vasopressors   - continue zosyn, f/u final cultures   - continue IVF resuscitation   - trend Cr Follow up Bcx  - monitor palmer output  - continue care per primary team   - d/w Dr. Avina

## 2025-02-18 NOTE — PROGRESS NOTE ADULT - ASSESSMENT
54M morbidly obese w/ transverse myelitis w/ paralysis from waist down, chronic palmer for neurogenic bladder, TESSIE on CPAP, HTN. Presents w/ hematuria and lethargy. Found to have UTI as well as emphysematous cystitis/pyeltits and mild R hydronephrosis. Developed hypotension requiring pressors.     #Neuro - awake and alert; continue gabapentin and Tizanidine; continue d  #CV - shock state likely hypovolemic vs distributive, give additional 1 L LR as POCUS showed IVC w/ respiratory variability, titrate norepi to MAP >/65; continue stress dose steroids- pt was on prednisone 50 mg for transverse myelitis   #Pulm - satting well on RA; CPAP nightly  #ID- continue zosyn for coverage to UTI, f/u blood cx and urine cx; restart home bactrim for PJP ppx  #Renal/metabolic - ESEQUIEL on CKD (baseline Cr 1.1 as reported by facility as of 4/2024), likely prerenal ATN; continue gentle maintenance IVF; monitor I/Os and electrolytes; urology f/u - hematuria appears improved, no further interventions for now  #GI- PO diet as tolerated; continue PPI   #Heme - no active issues  #Endo - continue lantus and FS w/ ISS  #Skin - wound care  #PPx - HSQ 7500 q8  #Dispo- remains in ICU in shock, prognosis guarded; full code      
54M morbidly obese w/ transverse myelitis w/ paralysis from waist down, chronic palmer for neurogenic bladder, TESSIE on CPAP, HTN. Presents w/ hematuria and lethargy. Found to have UTI as well as emphysematous cystitis/pyeltits and mild R hydronephrosis. Developed hypotension requiring pressors. Bacteremic w/ proteus and Urine cx growing proteus.    #Neuro - awake and alert; continue gabapentin and Tizanidine  #CV - shock state likely hypovolemic vs distributive, now resolved, continue midodrine; switch stress steroids back to prednisone 60 mg for transverse myelitis   #Pulm - satting well on RA; CPAP nightly  #ID- continue zosyn for coverage to UTI, blood and urine cx growing proteus; continue bactrim for PJP ppx  #Renal/metabolic - ESEQUIEL on CKD (baseline Cr 1.1 as reported by facility as of 4/2024), likely prerenal ATN, improving, non-oliguric; d/c maintenance IVF since able to take in PO; monitor I/Os and electrolytes; urology f/u - hematuria appears improved, no further interventions for now  #GI- PO diet as tolerated; continue PPI   #Heme - no active issues  #Endo - lantus increased for hyperglycemia, may need to increase pre-meal coverage as well, continue FS w/ ISS; continue levothyroxine  #Skin - wound care  #PPx - HSQ 7500 q8  #Dispo- stable for transfer to medical floors today, prognosis guarded; full code    Plan of care discussed w/ pt, all questions answered

## 2025-02-18 NOTE — DISCHARGE NOTE PROVIDER - NSDCCPCAREPLAN_GEN_ALL_CORE_FT
PRINCIPAL DISCHARGE DIAGNOSIS  Diagnosis: Sepsis  Assessment and Plan of Treatment:       SECONDARY DISCHARGE DIAGNOSES  Diagnosis: Acute UTI  Assessment and Plan of Treatment:

## 2025-02-19 VITALS
HEART RATE: 70 BPM | SYSTOLIC BLOOD PRESSURE: 108 MMHG | DIASTOLIC BLOOD PRESSURE: 71 MMHG | OXYGEN SATURATION: 97 % | RESPIRATION RATE: 12 BRPM

## 2025-02-19 LAB
-  AMPICILLIN/SULBACTAM: SIGNIFICANT CHANGE UP
-  AMPICILLIN: SIGNIFICANT CHANGE UP
-  AZTREONAM: SIGNIFICANT CHANGE UP
-  CEFAZOLIN: SIGNIFICANT CHANGE UP
-  CEFEPIME: SIGNIFICANT CHANGE UP
-  CEFOXITIN: SIGNIFICANT CHANGE UP
-  CEFTRIAXONE: SIGNIFICANT CHANGE UP
-  CIPROFLOXACIN: SIGNIFICANT CHANGE UP
-  ERTAPENEM: SIGNIFICANT CHANGE UP
-  GENTAMICIN: SIGNIFICANT CHANGE UP
-  LEVOFLOXACIN: SIGNIFICANT CHANGE UP
-  MEROPENEM: SIGNIFICANT CHANGE UP
-  PIPERACILLIN/TAZOBACTAM: SIGNIFICANT CHANGE UP
-  TOBRAMYCIN: SIGNIFICANT CHANGE UP
-  TRIMETHOPRIM/SULFAMETHOXAZOLE: SIGNIFICANT CHANGE UP
CULTURE RESULTS: ABNORMAL
CULTURE RESULTS: ABNORMAL
GRAM STN FLD: ABNORMAL
GRAM STN FLD: ABNORMAL
METHOD TYPE: SIGNIFICANT CHANGE UP
ORGANISM # SPEC MICROSCOPIC CNT: ABNORMAL
ORGANISM # SPEC MICROSCOPIC CNT: ABNORMAL
ORGANISM # SPEC MICROSCOPIC CNT: SIGNIFICANT CHANGE UP
SPECIMEN SOURCE: SIGNIFICANT CHANGE UP
SPECIMEN SOURCE: SIGNIFICANT CHANGE UP

## 2025-02-28 DIAGNOSIS — T83.511A INFECTION AND INFLAMMATORY REACTION DUE TO INDWELLING URETHRAL CATHETER, INITIAL ENCOUNTER: ICD-10-CM

## 2025-02-28 DIAGNOSIS — G47.33 OBSTRUCTIVE SLEEP APNEA (ADULT) (PEDIATRIC): ICD-10-CM

## 2025-02-28 DIAGNOSIS — R31.9 HEMATURIA, UNSPECIFIED: ICD-10-CM

## 2025-02-28 DIAGNOSIS — A41.59 OTHER GRAM-NEGATIVE SEPSIS: ICD-10-CM

## 2025-02-28 DIAGNOSIS — E78.5 HYPERLIPIDEMIA, UNSPECIFIED: ICD-10-CM

## 2025-02-28 DIAGNOSIS — E11.65 TYPE 2 DIABETES MELLITUS WITH HYPERGLYCEMIA: ICD-10-CM

## 2025-02-28 DIAGNOSIS — N17.9 ACUTE KIDNEY FAILURE, UNSPECIFIED: ICD-10-CM

## 2025-02-28 DIAGNOSIS — R65.21 SEVERE SEPSIS WITH SEPTIC SHOCK: ICD-10-CM

## 2025-02-28 DIAGNOSIS — G82.20 PARAPLEGIA, UNSPECIFIED: ICD-10-CM
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
## 2025-02-28 DIAGNOSIS — N31.9 NEUROMUSCULAR DYSFUNCTION OF BLADDER, UNSPECIFIED: ICD-10-CM
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
## 2025-02-28 DIAGNOSIS — E66.01 MORBID (SEVERE) OBESITY DUE TO EXCESS CALORIES: ICD-10-CM

## 2025-02-28 DIAGNOSIS — I10 ESSENTIAL (PRIMARY) HYPERTENSION: ICD-10-CM

## 2025-02-28 DIAGNOSIS — N13.6 PYONEPHROSIS: ICD-10-CM

## 2025-02-28 DIAGNOSIS — E11.22 TYPE 2 DIABETES MELLITUS WITH DIABETIC CHRONIC KIDNEY DISEASE: ICD-10-CM

## 2025-07-23 NOTE — PATIENT PROFILE ADULT - CHOOSE INDICATION TO IMMUNIZE (AN ORDER WILL BE GENERATED WHEN THIS NOTE IS SAVED):
Patient is not pregnant (male or female) Render Note In Bullet Format When Appropriate: No Duration Of Freeze Thaw-Cycle (Seconds): 5 Show Aperture Variable?: Yes Post-Care Instructions: I reviewed with the patient in detail post-care instructions. Patient is to wear sunprotection, and avoid picking at any of the treated lesions. Pt may apply Vaseline to crusted or scabbing areas. Consent: The patient's consent was obtained including but not limited to risks of crusting, scabbing, blistering, scarring, darker or lighter pigmentary change, recurrence, incomplete removal and infection. Detail Level: Detailed Number Of Freeze-Thaw Cycles: 2 freeze-thaw cycles